# Patient Record
Sex: FEMALE | Race: WHITE | Employment: OTHER | URBAN - METROPOLITAN AREA
[De-identification: names, ages, dates, MRNs, and addresses within clinical notes are randomized per-mention and may not be internally consistent; named-entity substitution may affect disease eponyms.]

---

## 2019-03-05 ENCOUNTER — HOSPITAL ENCOUNTER (OUTPATIENT)
Age: 64
Discharge: HOME | End: 2019-03-05
Payer: COMMERCIAL

## 2019-03-05 LAB
ABSOLUTE EOS #: 0.25 K/UL (ref 0–0.44)
ABSOLUTE IMMATURE GRANULOCYTE: 0.04 K/UL (ref 0–0.3)
ABSOLUTE LYMPH #: 1.78 K/UL (ref 1.1–3.7)
ABSOLUTE MONO #: 0.67 K/UL (ref 0.1–1.2)
ALBUMIN SERPL-MCNC: 4.2 G/DL (ref 3.5–5.2)
ALBUMIN/GLOBULIN RATIO: 1.6 (ref 1–2.5)
ALP BLD-CCNC: 52 U/L (ref 35–104)
ALT SERPL-CCNC: 11 U/L (ref 5–33)
ANION GAP SERPL CALCULATED.3IONS-SCNC: 16 MMOL/L (ref 9–17)
AST SERPL-CCNC: 24 U/L
BASOPHILS # BLD: 1 % (ref 0–2)
BASOPHILS ABSOLUTE: 0.06 K/UL (ref 0–0.2)
BILIRUB SERPL-MCNC: 0.38 MG/DL (ref 0.3–1.2)
BUN BLDV-MCNC: 17 MG/DL (ref 8–23)
BUN/CREAT BLD: ABNORMAL (ref 9–20)
CALCIUM SERPL-MCNC: 9.9 MG/DL (ref 8.6–10.4)
CHLORIDE BLD-SCNC: 106 MMOL/L (ref 98–107)
CHOLESTEROL, FASTING: 138 MG/DL
CHOLESTEROL/HDL RATIO: 3.5
CO2: 24 MMOL/L (ref 20–31)
CREAT SERPL-MCNC: 0.74 MG/DL (ref 0.5–0.9)
DIFFERENTIAL TYPE: ABNORMAL
EOSINOPHILS RELATIVE PERCENT: 3 % (ref 1–4)
GFR AFRICAN AMERICAN: >60 ML/MIN
GFR NON-AFRICAN AMERICAN: >60 ML/MIN
GFR SERPL CREATININE-BSD FRML MDRD: ABNORMAL ML/MIN/{1.73_M2}
GFR SERPL CREATININE-BSD FRML MDRD: ABNORMAL ML/MIN/{1.73_M2}
GLUCOSE FASTING: 123 MG/DL (ref 70–99)
HCT VFR BLD CALC: 47.4 % (ref 36.3–47.1)
HDLC SERPL-MCNC: 39 MG/DL
HEMOGLOBIN: 15.1 G/DL (ref 11.9–15.1)
IMMATURE GRANULOCYTES: 1 %
LDL CHOLESTEROL: 76 MG/DL (ref 0–130)
LYMPHOCYTES # BLD: 22 % (ref 24–43)
MCH RBC QN AUTO: 30.1 PG (ref 25.2–33.5)
MCHC RBC AUTO-ENTMCNC: 31.9 G/DL (ref 28.4–34.8)
MCV RBC AUTO: 94.6 FL (ref 82.6–102.9)
MONOCYTES # BLD: 8 % (ref 3–12)
NRBC AUTOMATED: 0 PER 100 WBC
PDW BLD-RTO: 12.5 % (ref 11.8–14.4)
PLATELET # BLD: 263 K/UL (ref 138–453)
PLATELET ESTIMATE: ABNORMAL
PMV BLD AUTO: 10 FL (ref 8.1–13.5)
POTASSIUM SERPL-SCNC: 4.6 MMOL/L (ref 3.7–5.3)
RBC # BLD: 5.01 M/UL (ref 3.95–5.11)
RBC # BLD: ABNORMAL 10*6/UL
SEG NEUTROPHILS: 65 % (ref 36–65)
SEGMENTED NEUTROPHILS ABSOLUTE COUNT: 5.22 K/UL (ref 1.5–8.1)
SODIUM BLD-SCNC: 146 MMOL/L (ref 135–144)
TOTAL PROTEIN: 6.9 G/DL (ref 6.4–8.3)
TRIGLYCERIDE, FASTING: 114 MG/DL
VLDLC SERPL CALC-MCNC: ABNORMAL MG/DL (ref 1–30)
WBC # BLD: 8 K/UL (ref 3.5–11.3)
WBC # BLD: ABNORMAL 10*3/UL

## 2019-03-05 PROCEDURE — 83525 ASSAY OF INSULIN: CPT

## 2019-03-05 PROCEDURE — 85025 COMPLETE CBC W/AUTO DIFF WBC: CPT

## 2019-03-05 PROCEDURE — 80061 LIPID PANEL: CPT

## 2019-03-05 PROCEDURE — 81001 URINALYSIS AUTO W/SCOPE: CPT

## 2019-03-05 PROCEDURE — 80053 COMPREHEN METABOLIC PANEL: CPT

## 2019-03-05 PROCEDURE — 83036 HEMOGLOBIN GLYCOSYLATED A1C: CPT

## 2019-03-05 PROCEDURE — 36415 COLL VENOUS BLD VENIPUNCTURE: CPT

## 2019-03-05 PROCEDURE — 83527 ASSAY OF INSULIN: CPT

## 2019-03-06 LAB
-: ABNORMAL
AMORPHOUS: ABNORMAL
BACTERIA: ABNORMAL
BILIRUBIN URINE: ABNORMAL
CASTS UA: ABNORMAL /LPF (ref 0–8)
COLOR: ABNORMAL
COMMENT UA: ABNORMAL
CRYSTALS, UA: ABNORMAL /HPF
CRYSTALS, UA: ABNORMAL /HPF
EPITHELIAL CELLS UA: ABNORMAL /HPF (ref 0–5)
ESTIMATED AVERAGE GLUCOSE: 114 MG/DL
GLUCOSE URINE: NEGATIVE
HBA1C MFR BLD: 5.6 % (ref 4–6)
KETONES, URINE: ABNORMAL
LEUKOCYTE ESTERASE, URINE: ABNORMAL
MUCUS: ABNORMAL
NITRITE, URINE: NEGATIVE
OTHER OBSERVATIONS UA: ABNORMAL
PH UA: 5.5 (ref 5–8)
PROTEIN UA: NEGATIVE
RBC UA: ABNORMAL /HPF (ref 0–4)
RENAL EPITHELIAL, UA: ABNORMAL /HPF
SPECIFIC GRAVITY UA: 1.02 (ref 1–1.03)
TRICHOMONAS: ABNORMAL
TURBIDITY: ABNORMAL
URINE HGB: NEGATIVE
UROBILINOGEN, URINE: NORMAL
WBC UA: ABNORMAL /HPF (ref 0–5)
YEAST: ABNORMAL

## 2019-03-08 LAB
INSULIN FREE: 34 UIU/ML (ref 3–19)
INSULIN: 40 UIU/ML (ref 3–19)

## 2021-09-12 ENCOUNTER — HOSPITAL ENCOUNTER (INPATIENT)
Age: 66
LOS: 1 days | Discharge: HOME | DRG: 313 | End: 2021-09-13
Attending: EMERGENCY MEDICINE | Admitting: INTERNAL MEDICINE
Payer: MEDICARE

## 2021-09-12 ENCOUNTER — APPOINTMENT (OUTPATIENT)
Dept: GENERAL RADIOLOGY | Facility: CLINIC | Age: 66
DRG: 313 | End: 2021-09-12
Payer: MEDICARE

## 2021-09-12 DIAGNOSIS — R07.9 CHEST PAIN, UNSPECIFIED TYPE: Primary | ICD-10-CM

## 2021-09-12 LAB
ABSOLUTE EOS #: 0.3 K/UL (ref 0–0.4)
ABSOLUTE IMMATURE GRANULOCYTE: ABNORMAL K/UL (ref 0–0.3)
ABSOLUTE LYMPH #: 2.4 K/UL (ref 1–4.8)
ABSOLUTE MONO #: 0.7 K/UL (ref 0.1–1.2)
ANION GAP SERPL CALCULATED.3IONS-SCNC: 10 MMOL/L (ref 9–17)
BASOPHILS # BLD: 1 % (ref 0–2)
BASOPHILS ABSOLUTE: 0.1 K/UL (ref 0–0.2)
BNP INTERPRETATION: NORMAL
BUN BLDV-MCNC: 15 MG/DL (ref 8–23)
BUN/CREAT BLD: ABNORMAL (ref 9–20)
CALCIUM SERPL-MCNC: 9.3 MG/DL (ref 8.6–10.4)
CHLORIDE BLD-SCNC: 103 MMOL/L (ref 98–107)
CO2: 27 MMOL/L (ref 20–31)
CREAT SERPL-MCNC: 0.6 MG/DL (ref 0.5–0.9)
D-DIMER QUANTITATIVE: 0.43 MG/L FEU
DIFFERENTIAL TYPE: ABNORMAL
EOSINOPHILS RELATIVE PERCENT: 4 % (ref 1–4)
GFR AFRICAN AMERICAN: >60 ML/MIN
GFR NON-AFRICAN AMERICAN: >60 ML/MIN
GFR SERPL CREATININE-BSD FRML MDRD: ABNORMAL ML/MIN/{1.73_M2}
GFR SERPL CREATININE-BSD FRML MDRD: ABNORMAL ML/MIN/{1.73_M2}
GLUCOSE BLD-MCNC: 129 MG/DL (ref 70–99)
HCT VFR BLD CALC: 46.6 % (ref 36–46)
HEMOGLOBIN: 15.4 G/DL (ref 12–16)
IMMATURE GRANULOCYTES: ABNORMAL %
LYMPHOCYTES # BLD: 30 % (ref 24–44)
MCH RBC QN AUTO: 29.7 PG (ref 26–34)
MCHC RBC AUTO-ENTMCNC: 33.1 G/DL (ref 31–37)
MCV RBC AUTO: 89.9 FL (ref 80–100)
MONOCYTES # BLD: 9 % (ref 2–11)
NRBC AUTOMATED: ABNORMAL PER 100 WBC
PDW BLD-RTO: 13.5 % (ref 12.5–15.4)
PLATELET # BLD: 250 K/UL (ref 140–450)
PLATELET ESTIMATE: ABNORMAL
PMV BLD AUTO: 7.7 FL (ref 6–12)
POTASSIUM SERPL-SCNC: 3.8 MMOL/L (ref 3.7–5.3)
PRO-BNP: 94 PG/ML
RBC # BLD: 5.19 M/UL (ref 4–5.2)
RBC # BLD: ABNORMAL 10*6/UL
SEG NEUTROPHILS: 56 % (ref 36–66)
SEGMENTED NEUTROPHILS ABSOLUTE COUNT: 4.6 K/UL (ref 1.8–7.7)
SODIUM BLD-SCNC: 140 MMOL/L (ref 135–144)
TROPONIN INTERP: NORMAL
TROPONIN T: NORMAL NG/ML
TROPONIN, HIGH SENSITIVITY: 6 NG/L (ref 0–14)
TROPONIN, HIGH SENSITIVITY: 6 NG/L (ref 0–14)
TROPONIN, HIGH SENSITIVITY: 7 NG/L (ref 0–14)
TROPONIN, HIGH SENSITIVITY: 8 NG/L (ref 0–14)
WBC # BLD: 8.2 K/UL (ref 3.5–11)
WBC # BLD: ABNORMAL 10*3/UL

## 2021-09-12 PROCEDURE — 80048 BASIC METABOLIC PNL TOTAL CA: CPT

## 2021-09-12 PROCEDURE — 83880 ASSAY OF NATRIURETIC PEPTIDE: CPT

## 2021-09-12 PROCEDURE — 85025 COMPLETE CBC W/AUTO DIFF WBC: CPT

## 2021-09-12 PROCEDURE — 71045 X-RAY EXAM CHEST 1 VIEW: CPT

## 2021-09-12 PROCEDURE — 99285 EMERGENCY DEPT VISIT HI MDM: CPT

## 2021-09-12 PROCEDURE — 84484 ASSAY OF TROPONIN QUANT: CPT

## 2021-09-12 PROCEDURE — 99219 PR INITIAL OBSERVATION CARE/DAY 50 MINUTES: CPT | Performed by: NURSE PRACTITIONER

## 2021-09-12 PROCEDURE — 36415 COLL VENOUS BLD VENIPUNCTURE: CPT

## 2021-09-12 PROCEDURE — 85379 FIBRIN DEGRADATION QUANT: CPT

## 2021-09-12 PROCEDURE — 93005 ELECTROCARDIOGRAM TRACING: CPT | Performed by: EMERGENCY MEDICINE

## 2021-09-12 PROCEDURE — 1200000000 HC SEMI PRIVATE

## 2021-09-12 RX ORDER — ACETAMINOPHEN 650 MG/1
650 SUPPOSITORY RECTAL EVERY 6 HOURS PRN
Status: DISCONTINUED | OUTPATIENT
Start: 2021-09-12 | End: 2021-09-13 | Stop reason: HOSPADM

## 2021-09-12 RX ORDER — LOSARTAN POTASSIUM 100 MG/1
100 TABLET ORAL DAILY
Status: DISCONTINUED | OUTPATIENT
Start: 2021-09-13 | End: 2021-09-13 | Stop reason: HOSPADM

## 2021-09-12 RX ORDER — ARIPIPRAZOLE 5 MG/1
10 TABLET ORAL DAILY
Status: DISCONTINUED | OUTPATIENT
Start: 2021-09-13 | End: 2021-09-13

## 2021-09-12 RX ORDER — SODIUM CHLORIDE 9 MG/ML
25 INJECTION, SOLUTION INTRAVENOUS PRN
Status: DISCONTINUED | OUTPATIENT
Start: 2021-09-12 | End: 2021-09-13 | Stop reason: HOSPADM

## 2021-09-12 RX ORDER — CARVEDILOL 12.5 MG/1
12.5 TABLET ORAL 2 TIMES DAILY WITH MEALS
Status: ON HOLD | COMMUNITY
End: 2021-09-13

## 2021-09-12 RX ORDER — AMLODIPINE BESYLATE 5 MG/1
5 TABLET ORAL DAILY
Status: DISCONTINUED | OUTPATIENT
Start: 2021-09-13 | End: 2021-09-13 | Stop reason: HOSPADM

## 2021-09-12 RX ORDER — SODIUM CHLORIDE 0.9 % (FLUSH) 0.9 %
5-40 SYRINGE (ML) INJECTION EVERY 12 HOURS SCHEDULED
Status: DISCONTINUED | OUTPATIENT
Start: 2021-09-12 | End: 2021-09-13 | Stop reason: HOSPADM

## 2021-09-12 RX ORDER — LOSARTAN POTASSIUM 50 MG/1
TABLET ORAL
Status: ON HOLD | COMMUNITY
End: 2021-09-13

## 2021-09-12 RX ORDER — CLOPIDOGREL BISULFATE 75 MG/1
75 TABLET ORAL DAILY
Status: DISCONTINUED | OUTPATIENT
Start: 2021-09-13 | End: 2021-09-13 | Stop reason: HOSPADM

## 2021-09-12 RX ORDER — OMEGA-3S/DHA/EPA/FISH OIL/D3 300MG-1000
400 CAPSULE ORAL DAILY
Status: ON HOLD | COMMUNITY
End: 2021-09-13

## 2021-09-12 RX ORDER — ACETAMINOPHEN 325 MG/1
650 TABLET ORAL EVERY 6 HOURS PRN
Status: DISCONTINUED | OUTPATIENT
Start: 2021-09-12 | End: 2021-09-13 | Stop reason: HOSPADM

## 2021-09-12 RX ORDER — ALBUTEROL SULFATE 2.5 MG/3ML
2.5 SOLUTION RESPIRATORY (INHALATION) EVERY 4 HOURS PRN
Status: DISCONTINUED | OUTPATIENT
Start: 2021-09-12 | End: 2021-09-13

## 2021-09-12 RX ORDER — PANTOPRAZOLE SODIUM 40 MG/1
40 TABLET, DELAYED RELEASE ORAL
Status: DISCONTINUED | OUTPATIENT
Start: 2021-09-13 | End: 2021-09-13 | Stop reason: HOSPADM

## 2021-09-12 RX ORDER — DULOXETIN HYDROCHLORIDE 60 MG/1
60 CAPSULE, DELAYED RELEASE ORAL DAILY
Status: DISCONTINUED | OUTPATIENT
Start: 2021-09-13 | End: 2021-09-13 | Stop reason: HOSPADM

## 2021-09-12 RX ORDER — HYDRALAZINE HYDROCHLORIDE 10 MG/1
10 TABLET, FILM COATED ORAL EVERY 8 HOURS SCHEDULED
Status: DISCONTINUED | OUTPATIENT
Start: 2021-09-12 | End: 2021-09-13 | Stop reason: HOSPADM

## 2021-09-12 RX ORDER — PANTOPRAZOLE SODIUM 40 MG/1
40 TABLET, DELAYED RELEASE ORAL DAILY
Status: ON HOLD | COMMUNITY
End: 2021-09-13

## 2021-09-12 RX ORDER — ASPIRIN 81 MG/1
81 TABLET, CHEWABLE ORAL DAILY
Status: DISCONTINUED | OUTPATIENT
Start: 2021-09-13 | End: 2021-09-13 | Stop reason: HOSPADM

## 2021-09-12 RX ORDER — CARVEDILOL 12.5 MG/1
12.5 TABLET ORAL 2 TIMES DAILY WITH MEALS
Status: DISCONTINUED | OUTPATIENT
Start: 2021-09-13 | End: 2021-09-13 | Stop reason: HOSPADM

## 2021-09-12 RX ORDER — ATORVASTATIN CALCIUM 40 MG/1
40 TABLET, FILM COATED ORAL NIGHTLY
Status: DISCONTINUED | OUTPATIENT
Start: 2021-09-12 | End: 2021-09-13

## 2021-09-12 RX ORDER — ONDANSETRON 2 MG/ML
4 INJECTION INTRAMUSCULAR; INTRAVENOUS EVERY 6 HOURS PRN
Status: DISCONTINUED | OUTPATIENT
Start: 2021-09-12 | End: 2021-09-13 | Stop reason: HOSPADM

## 2021-09-12 RX ORDER — ALBUTEROL SULFATE 2.5 MG/3ML
3 SOLUTION RESPIRATORY (INHALATION)
Status: ON HOLD | COMMUNITY
End: 2021-09-13

## 2021-09-12 RX ORDER — OXYBUTYNIN CHLORIDE 10 MG/1
10 TABLET, EXTENDED RELEASE ORAL DAILY
Status: ON HOLD | COMMUNITY
End: 2021-09-13

## 2021-09-12 RX ORDER — IPRATROPIUM BROMIDE AND ALBUTEROL SULFATE 2.5; .5 MG/3ML; MG/3ML
3 SOLUTION RESPIRATORY (INHALATION)
Status: ON HOLD | COMMUNITY
End: 2021-09-13

## 2021-09-12 RX ORDER — SIMVASTATIN 20 MG
20 TABLET ORAL NIGHTLY
Status: ON HOLD | COMMUNITY
Start: 2021-08-26 | End: 2021-09-13 | Stop reason: HOSPADM

## 2021-09-12 RX ORDER — AMLODIPINE BESYLATE 5 MG/1
5 TABLET ORAL ONCE
Status: COMPLETED | OUTPATIENT
Start: 2021-09-13 | End: 2021-09-13

## 2021-09-12 RX ORDER — ONDANSETRON 4 MG/1
4 TABLET, ORALLY DISINTEGRATING ORAL EVERY 8 HOURS PRN
Status: DISCONTINUED | OUTPATIENT
Start: 2021-09-12 | End: 2021-09-13 | Stop reason: HOSPADM

## 2021-09-12 RX ORDER — SODIUM CHLORIDE 0.9 % (FLUSH) 0.9 %
5-40 SYRINGE (ML) INJECTION PRN
Status: DISCONTINUED | OUTPATIENT
Start: 2021-09-12 | End: 2021-09-13 | Stop reason: HOSPADM

## 2021-09-12 RX ORDER — POLYETHYLENE GLYCOL 3350 17 G/17G
17 POWDER, FOR SOLUTION ORAL DAILY PRN
Status: DISCONTINUED | OUTPATIENT
Start: 2021-09-12 | End: 2021-09-13 | Stop reason: HOSPADM

## 2021-09-12 ASSESSMENT — ENCOUNTER SYMPTOMS
VOMITING: 0
BLOOD IN STOOL: 0
COUGH: 0
DIARRHEA: 0
COLOR CHANGE: 0
SHORTNESS OF BREATH: 1
ABDOMINAL PAIN: 0
CONSTIPATION: 0
NAUSEA: 0
WHEEZING: 0

## 2021-09-12 ASSESSMENT — PAIN SCALES - GENERAL: PAINLEVEL_OUTOF10: 0

## 2021-09-12 NOTE — ED NOTES
Mode of arrival (squad #, walk in, police, etc) : BE86        Chief complaint(s): chest pain        Arrival Note (brief scenario, treatment PTA, etc). : Pt reports chest pain onset at 2230. Pt reports that she was woken up by a pain in her chest that radiated to her jaw. Upon arrival to the ED pt denies complaints at this. Pt lives at assisted living facility, pt reports that she has been taking her medications as prescribed. C= \"Have you ever felt that you should Cut down on your drinking? \"  No  A= \"Have people Annoyed you by criticizing your drinking? \"  No  G= \"Have you ever felt bad or Guilty about your drinking? \"  No  E= \"Have you ever had a drink as an Eye-opener first thing in the morning to steady your nerves or to help a hangover? \"  No      Deferred []      Reason for deferring: N/A    *If yes to two or more: probable alcohol abuse. Panchito King RN  09/12/21 6704

## 2021-09-12 NOTE — ED NOTES
Pt assisted to bathroom per wheelchair, tolerated without complaints, no distress noted     Mary Carmen Bradshaw RN  09/12/21 9721

## 2021-09-12 NOTE — ED NOTES
Pt offered beverage, denies any needs at this time, continue to monitor, family at [place], RN  09/12/21 6400

## 2021-09-12 NOTE — ED PROVIDER NOTES
[facility]  [facility]  Phone: [phone]      Pt Name: Anup Cheung  ACZ:[id number]  [place] 1955  Date of evaluation: 9/12/2021      CHIEF COMPLAINT       Chief Complaint   Patient presents with    Chest Pain       HISTORY OF PRESENT ILLNESS   Anup Cheung is a 78 y.o. female with history of hypertension, CVA, Parkinson's, and asthma who presents for evaluation of chest pain. The patient reports that starting around 10:30 PM last night she began having intermittent episodes of sharp, stabbing, right-sided chest pain that would intermittently radiate into her right jaw. She also complains of associated shortness of breath and nonproductive cough. She has not taken any medications for her symptoms and does not list any provoking or palliating factors. She lives in assisted living and called 911. EMS gave her 4 baby aspirin. Patient denies any personal history of CAD or blood clots but does have family history of CAD. She had a stress test years ago that was negative. She currently denies any chest pain. The patient denies fever, chills, headache, vision changes, neck pain, back pain, diaphoresis, abdominal pain, nausea, vomiting, dizziness, lightheadedness, focal weakness, numbness, tingling, recent injury or illness. REVIEW OF SYSTEMS     Ten point review of systems was reviewed and is negative unless otherwise noted in the HPI    [address]    has a past medical history of Arthritis, Asthma, Cerebral artery occlusion with cerebral infarction ([address] Depression, Gastric reflux, Hyperlipidemia, Hypertension, Parkinson disease ([address] Restless leg, Sleep apnea, and Urinary incontinence. SURGICAL HISTORY      has a past surgical history that includes Wrist surgery; Tonsillectomy; Adenoidectomy; Foot surgery; and Cardiac catheterization.     CURRENT MEDICATIONS       Previous Medications    ALBUTEROL (PROVENTIL) (2.5 MG/3ML) 0.083% NEBULIZER SOLUTION    3 mLs    ALBUTEROL SULFATE HFA (PROAIR HFA) 108 (90 BASE) MCG/ACT INHALER    Inhale 2 puffs every 4 hours by inhalation route for 30 days. AMLODIPINE (NORVASC) 5 MG TABLET    TAKE ONE TABLET BY MOUTH ONCE DAILY    ARIPIPRAZOLE (ABILIFY) 10 MG TABLET    Take 1 tablet every day by oral route. ATORVASTATIN (LIPITOR) 20 MG TABLET    TAKE ONE TABLET BY MOUTH ONCE DAILY AT BEDTIME    CALCIUM CARBONATE (CALCIUM 600 PO)    Take by mouth    CARVEDILOL (COREG) 12.5 MG TABLET    TAKE ONE TABLET BY MOUTH TWICE A DAY WITH MORNING AND EVENING MEAL. CLOPIDOGREL (PLAVIX) 75 MG TABLET    TAKE ONE TABLET BY MOUTH ONCE DAILY    DULOXETINE (CYMBALTA) 60 MG EXTENDED RELEASE CAPSULE    duloxetine 60 mg capsule,delayed release    HYDRALAZINE (APRESOLINE) 10 MG TABLET    hydralazine 10 mg tablet    HYDROCODONE-ACETAMINOPHEN (NORCO) 5-325 MG PER TABLET    Take 1 tablet every 8 hours by oral route as needed. LOSARTAN (COZAAR) 100 MG TABLET    TAKE ONE TABLET BY MOUTH ONCE DAILY    OXYBUTYNIN (DITROPAN-XL) 10 MG EXTENDED RELEASE TABLET    TAKE ONE TABLET BY MOUTH ONCE DAILY    PANTOPRAZOLE (PROTONIX) 40 MG TABLET    TAKE ONE TABLET BY MOUTH ONCE DAILY FOR  90  DAYS    ROPINIROLE (REQUIP) 0.5 MG TABLET    ropinirole 0.5 mg tablet    TRIHEXYPHENIDYL (ARTANE) 2 MG TABLET    Take 2 mg by mouth    ZOLEDRONIC ACID (RECLAST IV)    Infuse intravenously       ALLERGIES     is allergic to codeine, erythromycin base, lisinopril, penicillins, phenobarbital, tetanus-diphtheria toxoids td, tetracyclines & related, and nickel. FAMILY HISTORY     has no family status information on file. family history is not on file. SOCIAL HISTORY      reports that she has never smoked. She does not have any smokeless tobacco history on file. She reports that she does not drink alcohol and does not use drugs. PHYSICAL EXAM     INITIAL VITALS:  height is 5' 2\" (1.575 m) and weight is 99.8 kg (220 lb).  Her oral temperature is 97.9 °F (36.6 °C). Her blood pressure is 141/76 (abnormal) and her pulse is 76. Her respiration is 16 and oxygen saturation is 95%. CONSTITUTIONAL: no apparent distress, well appearing, masklike facies  SKIN: warm, dry, no jaundice, hives or petechiae  EYES: clear conjunctiva, non-icteric sclera  HENT: normocephalic, atraumatic, moist mucus membranes  NECK: Nontender and supple with no nuchal rigidity, full range of motion  PULMONARY: clear to auscultation without wheezes, rhonchi, or rales, normal excursion, no accessory muscle use and no stridor  CARDIOVASCULAR: regular rate, rhythm. Strong radial pulses with intact distal perfusion. Capillary refill <2 seconds. GASTROINTESTINAL: soft, non-tender, non-distended, no palpable masses, no rebound or guarding   GENITOURINARY: No costovertebral angle tenderness to palpation  MUSCULOSKELETAL: No midline spinal tenderness, step off or deformity. Extremities are otherwise nontender to palpation and nonerythematous. Compartments soft. No peripheral edema. NEUROLOGIC: alert and oriented x 3, GCS 15, normal mentation and speech. Moves all extremities x 4 without motor or sensory deficit  PSYCHIATRIC: normal mood and affect, thought process is clear and linear    DIAGNOSTIC RESULTS     EKG:  EKG 5:51 AM sinus rhythm, rate 80 bpm, normal axis, normal intervals, no ST elevation or depression, T wave flattening in 2 and aVF, T wave inversion in V2, V3, V4, V5, V6, 1 and aVL, poor R wave progression, Q wave in aVR, no previous EKG for comparison    RADIOLOGY:   XR CHEST PORTABLE    Result Date: 9/12/2021  EXAMINATION: ONE XRAY VIEW OF THE CHEST 9/12/2021 6:14 am COMPARISON: None. HISTORY: ORDERING SYSTEM PROVIDED HISTORY: chest pain TECHNOLOGIST PROVIDED HISTORY: chest pain Reason for Exam: Atypical Mid Chest pain Acuity: Acute Type of Exam: Initial FINDINGS: Heart size and pulmonary vasculature are normal.  The lungs are clear and normally expanded.   Surrounding osseous and soft tissue structures are unremarkable. Normal examination.        LABS:  Results for orders placed or performed during the hospital encounter of 09/12/21   CBC Auto Differential   Result Value Ref Range    WBC 8.2 3.5 - 11.0 k/uL    RBC 5.19 4.0 - 5.2 m/uL    Hemoglobin 15.4 12.0 - 16.0 g/dL    Hematocrit 46.6 (H) 36 - 46 %    MCV 89.9 80 - 100 fL    MCH 29.7 26 - 34 pg    MCHC 33.1 31 - 37 g/dL    RDW 13.5 12.5 - 15.4 %    Platelets 099 412 - 008 k/uL    MPV 7.7 6.0 - 12.0 fL    NRBC Automated NOT REPORTED per 100 WBC    Differential Type NOT REPORTED     Seg Neutrophils 56 36 - 66 %    Lymphocytes 30 24 - 44 %    Monocytes 9 2 - 11 %    Eosinophils % 4 1 - 4 %    Basophils 1 0 - 2 %    Immature Granulocytes NOT REPORTED 0 %    Segs Absolute 4.60 1.8 - 7.7 k/uL    Absolute Lymph # 2.40 1.0 - 4.8 k/uL    Absolute Mono # 0.70 0.1 - 1.2 k/uL    Absolute Eos # 0.30 0.0 - 0.4 k/uL    Basophils Absolute 0.10 0.0 - 0.2 k/uL    Absolute Immature Granulocyte NOT REPORTED 0.00 - 0.30 k/uL    WBC Morphology NOT REPORTED     RBC Morphology NOT REPORTED     Platelet Estimate NOT REPORTED    Basic Metabolic Panel w/ Reflex to MG   Result Value Ref Range    Glucose 129 (H) 70 - 99 mg/dL    BUN 15 8 - 23 mg/dL    CREATININE 0.60 0.50 - 0.90 mg/dL    Bun/Cre Ratio NOT REPORTED 9 - 20    Calcium 9.3 8.6 - 10.4 mg/dL    Sodium 140 135 - 144 mmol/L    Potassium 3.8 3.7 - 5.3 mmol/L    Chloride 103 98 - 107 mmol/L    CO2 27 20 - 31 mmol/L    Anion Gap 10 9 - 17 mmol/L    GFR Non-African American >60 >60 mL/min    GFR African American >60 >60 mL/min    GFR Comment          GFR Staging NOT REPORTED    Troponin   Result Value Ref Range    Troponin, High Sensitivity 7 0 - 14 ng/L    Troponin T NOT REPORTED <0.03 ng/mL    Troponin Interp NOT REPORTED    Brain Natriuretic Peptide   Result Value Ref Range    Pro-BNP 94 <300 pg/mL    BNP Interpretation Pro-BNP Reference Range:    D-Dimer, Quantitative   Result Value Ref Range    D-Dimer, Quant 0.43 mg/L FEU       EMERGENCY DEPARTMENT COURSE:        The patient was given the following medications:  No orders of the defined types were placed in this encounter. Vitals:    Vitals:    09/12/21 0547 09/12/21 0657 09/12/21 0718   BP: 128/76  (!) 141/76   Pulse: 83  76   Resp: 19  16   Temp:  97.9 °F (36.6 °C)    TempSrc:  Oral    SpO2: 96%  95%   Weight: 99.8 kg (220 lb)     Height: 5' 2\" (1.575 m)       -------------------------  BP: (!) 141/76, Temp: 97.9 °F (36.6 °C), Pulse: 76, Resp: 16    CONSULTS:  None    CRITICAL CARE:   None    PROCEDURES:  None    DIAGNOSIS/ MDM:   Barney Chilel is a 78 y.o. female who presents with chest pain. Vital signs are stable. She is currently chest pain-free. Exam is grossly unremarkable. CBC, BMP, troponin, D-dimer and BNP are unremarkable. Chest x-ray shows no acute process. Plan to obtain second troponin with repeat EKG. The patient was signed out to Dr. Cesar Fine.         FINAL IMPRESSION    Chest pain    DISPOSITION/PLAN   DISPOSITION        (Please note that portions of this note were completed with a voice recognitionprogram.  Efforts were made to edit the dictations but occasionally words are mis-transcribed.)    Tavo Edward DO DO  Emergency Physician Attending         Tavo Edward DO  09/12/21 5133

## 2021-09-12 NOTE — ED PROVIDER NOTES
TABLET    TAKE ONE TABLET BY MOUTH ONCE DAILY FOR  90  DAYS    ROPINIROLE (REQUIP) 0.5 MG TABLET    ropinirole 0.5 mg tablet    TRIHEXYPHENIDYL (ARTANE) 2 MG TABLET    Take 2 mg by mouth    ZOLEDRONIC ACID (RECLAST IV)    Infuse intravenously       ALLERGIES     is allergic to codeine, erythromycin base, lisinopril, penicillins, phenobarbital, tetanus-diphtheria toxoids td, tetracyclines & related, and nickel. FAMILY HISTORY     has no family status information on file. family history is not on file. SOCIAL HISTORY      reports that she has never smoked. She does not have any smokeless tobacco history on file. She reports that she does not drink alcohol and does not use drugs. Diagnostic Results     EKG     2nd EKG 8:07 AM sinus rhythm rate 79  QRS 88  no ST elevations. Diffuse T wave inversions.   No change from 1st EKG    LABS:   Results for orders placed or performed during the hospital encounter of 09/12/21   CBC Auto Differential   Result Value Ref Range    WBC 8.2 3.5 - 11.0 k/uL    RBC 5.19 4.0 - 5.2 m/uL    Hemoglobin 15.4 12.0 - 16.0 g/dL    Hematocrit 46.6 (H) 36 - 46 %    MCV 89.9 80 - 100 fL    MCH 29.7 26 - 34 pg    MCHC 33.1 31 - 37 g/dL    RDW 13.5 12.5 - 15.4 %    Platelets 019 769 - 660 k/uL    MPV 7.7 6.0 - 12.0 fL    NRBC Automated NOT REPORTED per 100 WBC    Differential Type NOT REPORTED     Seg Neutrophils 56 36 - 66 %    Lymphocytes 30 24 - 44 %    Monocytes 9 2 - 11 %    Eosinophils % 4 1 - 4 %    Basophils 1 0 - 2 %    Immature Granulocytes NOT REPORTED 0 %    Segs Absolute 4.60 1.8 - 7.7 k/uL    Absolute Lymph # 2.40 1.0 - 4.8 k/uL    Absolute Mono # 0.70 0.1 - 1.2 k/uL    Absolute Eos # 0.30 0.0 - 0.4 k/uL    Basophils Absolute 0.10 0.0 - 0.2 k/uL    Absolute Immature Granulocyte NOT REPORTED 0.00 - 0.30 k/uL    WBC Morphology NOT REPORTED     RBC Morphology NOT REPORTED     Platelet Estimate NOT REPORTED    Basic Metabolic Panel w/ Reflex to MG   Result Value Ref Range    Glucose 129 (H) 70 - 99 mg/dL    BUN 15 8 - 23 mg/dL    CREATININE 0.60 0.50 - 0.90 mg/dL    Bun/Cre Ratio NOT REPORTED 9 - 20    Calcium 9.3 8.6 - 10.4 mg/dL    Sodium 140 135 - 144 mmol/L    Potassium 3.8 3.7 - 5.3 mmol/L    Chloride 103 98 - 107 mmol/L    CO2 27 20 - 31 mmol/L    Anion Gap 10 9 - 17 mmol/L    GFR Non-African American >60 >60 mL/min    GFR African American >60 >60 mL/min    GFR Comment          GFR Staging NOT REPORTED    Troponin   Result Value Ref Range    Troponin, High Sensitivity 7 0 - 14 ng/L    Troponin T NOT REPORTED <0.03 ng/mL    Troponin Interp NOT REPORTED    Brain Natriuretic Peptide   Result Value Ref Range    Pro-BNP 94 <300 pg/mL    BNP Interpretation Pro-BNP Reference Range:    D-Dimer, Quantitative   Result Value Ref Range    D-Dimer, Quant 0.43 mg/L FEU   Troponin   Result Value Ref Range    Troponin, High Sensitivity 6 0 - 14 ng/L    Troponin T NOT REPORTED <0.03 ng/mL    Troponin Interp NOT REPORTED    EKG 12 Lead   Result Value Ref Range    Ventricular Rate 80 BPM    Atrial Rate 80 BPM    P-R Interval 138 ms    QRS Duration 84 ms    Q-T Interval 412 ms    QTc Calculation (Bazett) 475 ms    P Axis 21 degrees    R Axis -16 degrees    T Axis -174 degrees   EKG 12 Lead   Result Value Ref Range    Ventricular Rate 79 BPM    Atrial Rate 79 BPM    P-R Interval 134 ms    QRS Duration 88 ms    Q-T Interval 392 ms    QTc Calculation (Bazett) 449 ms    P Axis 20 degrees    R Axis -15 degrees    T Axis 175 degrees       RADIOLOGY:  XR CHEST PORTABLE   Final Result   Normal examination. ED Course     The patient was given the following medications:  No orders of the defined types were placed in this encounter. RECENT VITALS:  BP (!) 141/76   Pulse 76   Temp 97.9 °F (36.6 °C) (Oral)   Resp 16   Ht 5' 2\" (1.575 m)   Wt 99.8 kg (220 lb)   SpO2 95%   BMI 40.24 kg/m²     7:15 AM care assumed from Dr. Madie Christian. Awaiting 2nd troponin and EKG.   1st troponin normal.  CBC and BMP unremarkable. 9 AM repeat troponin normal repeat EKG unchanged. Chest pain with radiation to her jaw and associated shortness of breath and diaphoresis. No cardiologist.  Recommend observation patient agreeable. Will discuss with Brayden Brownlee. 950 AM Discussed with Dr. Sy Saenz at [phone] agrees to admission. Awaiting bed assignment and transport. OARRS Report if indicated             I have reviewed the disposition diagnosis with the patient and or their family/guardian. I have answered their questions and given discharge instructions. They voiced understanding of these instructions and did not have any further questions or complaints. Disposition     CLINICAL IMPRESSION:  1. Chest pain, unspecified type        PATIENT REFERRED TO:  No follow-up provider specified. DISCHARGE MEDICATIONS:  New Prescriptions    No medications on file       DISPOSITION:   DISPOSITION Decision To Admit 09/12/2021 08:53:27 AM      (Please note that portions of this note were completed with a voice recognition program.  Efforts were made to edit the dictations but occasionally words are mis-transcribed.)    DO SEAN HatfieldO.          Felipe Brown DO  09/14/21 8267

## 2021-09-12 NOTE — ED NOTES
Pt resting,denies chest pain, updated on plan of care     [facility], RN  09/12/21 9631

## 2021-09-13 ENCOUNTER — APPOINTMENT (OUTPATIENT)
Dept: NUCLEAR MEDICINE | Age: 66
DRG: 313 | End: 2021-09-13
Payer: MEDICARE

## 2021-09-13 VITALS
HEIGHT: 62 IN | BODY MASS INDEX: 40.48 KG/M2 | SYSTOLIC BLOOD PRESSURE: 119 MMHG | HEART RATE: 70 BPM | WEIGHT: 220 LBS | RESPIRATION RATE: 16 BRPM | OXYGEN SATURATION: 95 % | TEMPERATURE: 97.7 F | DIASTOLIC BLOOD PRESSURE: 73 MMHG

## 2021-09-13 LAB
EKG ATRIAL RATE: 79 BPM
EKG ATRIAL RATE: 80 BPM
EKG P AXIS: 20 DEGREES
EKG P AXIS: 21 DEGREES
EKG P-R INTERVAL: 134 MS
EKG P-R INTERVAL: 138 MS
EKG Q-T INTERVAL: 392 MS
EKG Q-T INTERVAL: 412 MS
EKG QRS DURATION: 84 MS
EKG QRS DURATION: 88 MS
EKG QTC CALCULATION (BAZETT): 449 MS
EKG QTC CALCULATION (BAZETT): 475 MS
EKG R AXIS: -15 DEGREES
EKG R AXIS: -16 DEGREES
EKG T AXIS: -174 DEGREES
EKG T AXIS: 175 DEGREES
EKG VENTRICULAR RATE: 79 BPM
EKG VENTRICULAR RATE: 80 BPM
HCT VFR BLD CALC: 47.9 % (ref 36.3–47.1)
HEMOGLOBIN: 14.9 G/DL (ref 11.9–15.1)
LV EF: 70 %
LVEF MODALITY: NORMAL
MCH RBC QN AUTO: 29.1 PG (ref 25.2–33.5)
MCHC RBC AUTO-ENTMCNC: 31.1 G/DL (ref 28.4–34.8)
MCV RBC AUTO: 93.6 FL (ref 82.6–102.9)
NRBC AUTOMATED: 0 PER 100 WBC
PDW BLD-RTO: 12.9 % (ref 11.8–14.4)
PLATELET # BLD: 257 K/UL (ref 138–453)
PMV BLD AUTO: 9.6 FL (ref 8.1–13.5)
RBC # BLD: 5.12 M/UL (ref 3.95–5.11)
TROPONIN INTERP: NORMAL
TROPONIN T: NORMAL NG/ML
TROPONIN, HIGH SENSITIVITY: <6 NG/L (ref 0–14)
WBC # BLD: 8.3 K/UL (ref 3.5–11.3)

## 2021-09-13 PROCEDURE — 78452 HT MUSCLE IMAGE SPECT MULT: CPT

## 2021-09-13 PROCEDURE — 6360000002 HC RX W HCPCS: Performed by: INTERNAL MEDICINE

## 2021-09-13 PROCEDURE — 2580000003 HC RX 258: Performed by: INTERNAL MEDICINE

## 2021-09-13 PROCEDURE — 85027 COMPLETE CBC AUTOMATED: CPT

## 2021-09-13 PROCEDURE — 93017 CV STRESS TEST TRACING ONLY: CPT

## 2021-09-13 PROCEDURE — 2580000003 HC RX 258: Performed by: STUDENT IN AN ORGANIZED HEALTH CARE EDUCATION/TRAINING PROGRAM

## 2021-09-13 PROCEDURE — 36415 COLL VENOUS BLD VENIPUNCTURE: CPT

## 2021-09-13 PROCEDURE — A9500 TC99M SESTAMIBI: HCPCS | Performed by: INTERNAL MEDICINE

## 2021-09-13 PROCEDURE — 6360000002 HC RX W HCPCS: Performed by: STUDENT IN AN ORGANIZED HEALTH CARE EDUCATION/TRAINING PROGRAM

## 2021-09-13 PROCEDURE — 6370000000 HC RX 637 (ALT 250 FOR IP): Performed by: NURSE PRACTITIONER

## 2021-09-13 PROCEDURE — 6370000000 HC RX 637 (ALT 250 FOR IP): Performed by: INTERNAL MEDICINE

## 2021-09-13 PROCEDURE — 99238 HOSP IP/OBS DSCHRG MGMT 30/<: CPT | Performed by: STUDENT IN AN ORGANIZED HEALTH CARE EDUCATION/TRAINING PROGRAM

## 2021-09-13 PROCEDURE — 3430000000 HC RX DIAGNOSTIC RADIOPHARMACEUTICAL: Performed by: INTERNAL MEDICINE

## 2021-09-13 PROCEDURE — 84484 ASSAY OF TROPONIN QUANT: CPT

## 2021-09-13 PROCEDURE — 6370000000 HC RX 637 (ALT 250 FOR IP): Performed by: STUDENT IN AN ORGANIZED HEALTH CARE EDUCATION/TRAINING PROGRAM

## 2021-09-13 RX ORDER — NITROGLYCERIN 0.4 MG/1
0.4 TABLET SUBLINGUAL EVERY 5 MIN PRN
Status: ACTIVE | OUTPATIENT
Start: 2021-09-13 | End: 2021-09-13

## 2021-09-13 RX ORDER — GLUCOSAMINE/CHONDR SU A SOD 750-600 MG
1 TABLET ORAL DAILY
COMMUNITY

## 2021-09-13 RX ORDER — IBUPROFEN 400 MG/1
400 TABLET ORAL EVERY 6 HOURS PRN
COMMUNITY

## 2021-09-13 RX ORDER — CETIRIZINE HYDROCHLORIDE 10 MG/1
10 TABLET ORAL DAILY PRN
COMMUNITY

## 2021-09-13 RX ORDER — SODIUM CHLORIDE 9 MG/ML
500 INJECTION, SOLUTION INTRAVENOUS CONTINUOUS PRN
Status: ACTIVE | OUTPATIENT
Start: 2021-09-13 | End: 2021-09-13

## 2021-09-13 RX ORDER — VALBENAZINE 40 MG/1
40 CAPSULE ORAL DAILY
COMMUNITY

## 2021-09-13 RX ORDER — ALBUTEROL SULFATE 90 UG/1
2 AEROSOL, METERED RESPIRATORY (INHALATION)
Status: DISCONTINUED | OUTPATIENT
Start: 2021-09-13 | End: 2021-09-13 | Stop reason: HOSPADM

## 2021-09-13 RX ORDER — METOPROLOL TARTRATE 5 MG/5ML
5 INJECTION INTRAVENOUS EVERY 5 MIN PRN
Status: ACTIVE | OUTPATIENT
Start: 2021-09-13 | End: 2021-09-13

## 2021-09-13 RX ORDER — ATROPINE SULFATE 0.1 MG/ML
0.5 INJECTION INTRAVENOUS EVERY 5 MIN PRN
Status: ACTIVE | OUTPATIENT
Start: 2021-09-13 | End: 2021-09-13

## 2021-09-13 RX ORDER — SODIUM CHLORIDE 0.9 % (FLUSH) 0.9 %
5-40 SYRINGE (ML) INJECTION PRN
Status: ACTIVE | OUTPATIENT
Start: 2021-09-13 | End: 2021-09-13

## 2021-09-13 RX ORDER — AMINOPHYLLINE DIHYDRATE 25 MG/ML
50 INJECTION, SOLUTION INTRAVENOUS PRN
Status: ACTIVE | OUTPATIENT
Start: 2021-09-13 | End: 2021-09-13

## 2021-09-13 RX ORDER — ATORVASTATIN CALCIUM 40 MG/1
40 TABLET, FILM COATED ORAL NIGHTLY
Qty: 30 TABLET | Refills: 3 | Status: SHIPPED | OUTPATIENT
Start: 2021-09-13

## 2021-09-13 RX ORDER — MULTIVITAMIN
1 TABLET ORAL DAILY
COMMUNITY

## 2021-09-13 RX ORDER — ATORVASTATIN CALCIUM 10 MG/1
10 TABLET, FILM COATED ORAL NIGHTLY
Status: DISCONTINUED | OUTPATIENT
Start: 2021-09-13 | End: 2021-09-13

## 2021-09-13 RX ORDER — ALBUTEROL SULFATE 90 UG/1
2 AEROSOL, METERED RESPIRATORY (INHALATION) PRN
Status: ACTIVE | OUTPATIENT
Start: 2021-09-13 | End: 2021-09-13

## 2021-09-13 RX ORDER — ACETAMINOPHEN 325 MG/1
650 TABLET ORAL
COMMUNITY

## 2021-09-13 RX ORDER — FLUTICASONE PROPIONATE 50 MCG
1 SPRAY, SUSPENSION (ML) NASAL DAILY
COMMUNITY

## 2021-09-13 RX ORDER — ATORVASTATIN CALCIUM 40 MG/1
40 TABLET, FILM COATED ORAL NIGHTLY
Status: DISCONTINUED | OUTPATIENT
Start: 2021-09-13 | End: 2021-09-13 | Stop reason: HOSPADM

## 2021-09-13 RX ORDER — BUDESONIDE AND FORMOTEROL FUMARATE DIHYDRATE 160; 4.5 UG/1; UG/1
2 AEROSOL RESPIRATORY (INHALATION) 2 TIMES DAILY
COMMUNITY

## 2021-09-13 RX ORDER — BUDESONIDE AND FORMOTEROL FUMARATE DIHYDRATE 160; 4.5 UG/1; UG/1
2 AEROSOL RESPIRATORY (INHALATION) 2 TIMES DAILY
Status: DISCONTINUED | OUTPATIENT
Start: 2021-09-13 | End: 2021-09-13 | Stop reason: HOSPADM

## 2021-09-13 RX ADMIN — HYDRALAZINE HYDROCHLORIDE 10 MG: 10 TABLET, FILM COATED ORAL at 14:02

## 2021-09-13 RX ADMIN — AMLODIPINE BESYLATE 5 MG: 5 TABLET ORAL at 00:06

## 2021-09-13 RX ADMIN — ASPIRIN 81 MG: 81 TABLET, CHEWABLE ORAL at 09:21

## 2021-09-13 RX ADMIN — SODIUM CHLORIDE, PRESERVATIVE FREE 10 ML: 5 INJECTION INTRAVENOUS at 09:33

## 2021-09-13 RX ADMIN — CARVEDILOL 12.5 MG: 12.5 TABLET, FILM COATED ORAL at 09:20

## 2021-09-13 RX ADMIN — CLOPIDOGREL BISULFATE 75 MG: 75 TABLET ORAL at 09:21

## 2021-09-13 RX ADMIN — TETRAKIS(2-METHOXYISOBUTYLISOCYANIDE)COPPER(I) TETRAFLUOROBORATE 18.4 MILLICURIE: 1 INJECTION, POWDER, LYOPHILIZED, FOR SOLUTION INTRAVENOUS at 08:08

## 2021-09-13 RX ADMIN — ATORVASTATIN CALCIUM 10 MG: 10 TABLET, FILM COATED ORAL at 00:06

## 2021-09-13 RX ADMIN — HYDRALAZINE HYDROCHLORIDE 10 MG: 10 TABLET, FILM COATED ORAL at 00:06

## 2021-09-13 RX ADMIN — ENOXAPARIN SODIUM 40 MG: 40 INJECTION SUBCUTANEOUS at 09:27

## 2021-09-13 RX ADMIN — ACETAMINOPHEN 650 MG: 325 TABLET ORAL at 14:01

## 2021-09-13 RX ADMIN — AMLODIPINE BESYLATE 5 MG: 5 TABLET ORAL at 09:21

## 2021-09-13 RX ADMIN — SODIUM CHLORIDE, PRESERVATIVE FREE 10 ML: 5 INJECTION INTRAVENOUS at 08:06

## 2021-09-13 RX ADMIN — REGADENOSON 0.4 MG: 0.08 INJECTION, SOLUTION INTRAVENOUS at 08:06

## 2021-09-13 RX ADMIN — TETRAKIS(2-METHOXYISOBUTYLISOCYANIDE)COPPER(I) TETRAFLUOROBORATE 40.8 MILLICURIE: 1 INJECTION, POWDER, LYOPHILIZED, FOR SOLUTION INTRAVENOUS at 11:25

## 2021-09-13 RX ADMIN — SODIUM CHLORIDE, PRESERVATIVE FREE 10 ML: 5 INJECTION INTRAVENOUS at 00:07

## 2021-09-13 RX ADMIN — DULOXETINE HYDROCHLORIDE 60 MG: 60 CAPSULE, DELAYED RELEASE ORAL at 09:21

## 2021-09-13 RX ADMIN — LOSARTAN POTASSIUM 100 MG: 100 TABLET, FILM COATED ORAL at 09:19

## 2021-09-13 ASSESSMENT — PAIN SCALES - GENERAL
PAINLEVEL_OUTOF10: 0
PAINLEVEL_OUTOF10: 6

## 2021-09-13 NOTE — DISCHARGE SUMMARY
[facility]  Office: [address], DO, Jak Júniorse, DO, Dustin Gucci, DO, Maynor Rahman, DO, Sammy Mata MD, Kali Gray MD, Willian Alvarez MD, Bennett Kaplan MD, Laura Oakes MD, Dillan Patel MD, Javy Manley MD, Rodney Serrato, DO, Reinier Bruce, DO, Carlotta Sotelo MD,  Lauri Bernal DO, Wicho Bowden MD, Nilsa Yap MD, Izabella Dubon MD, Chon Masterson MD, , Erika Aguilar MD, Marta Chowdhury MD, Odette Vanegas MD, Nas Bundy, [facility], [facility], CNP, Yvon Lemons, CNP, Mar Reed, CNS, Grace Dutta, CNP, Ashley Olsen, CNP, Lori Leary, CNP, Mendez Alexander, CNP, Janell Mtz, CNP, Isa Haywood PA-C, Kristen Antonio, [facility], Therese Juárez, CNP, Pattie La, CNP, Nydia Dodd, CNP, Vicki Irizarry, CNP, Spike Young, CNP, Gemini Lawrence, CNP, Dena Lawrence, [facility], [facility]    Discharge Summary     Patient ID: Hipolito Molina  :  1955   MRN: [id number]     ACCOUNT:  [de-identified]   Patient's PCP: Yobany Gilbert DO  Admit Date: 2021   Discharge Date: 2021     Length of Stay: 1  Code Status:  Full Code  Admitting Physician: Chon Masterson MD  Discharge Physician: Chon Masterson MD     Active Discharge Diagnoses:     Hospital Problem Lists:  Principal Problem:    Chest pain  Active Problems:    Parkinson's disease (tremor, stiffness, slow motion, unstable posture) ([facility]    Essential hypertension    Dyslipidemia    Morbid obesity with BMI of 40.0-44.9, adult (HCC)    ANA on CPAP  Resolved Problems:    * No resolved hospital problems. *      Admission Condition:  good     Discharged Condition: good    Hospital Stay:     Hospital Course:  Hipolito Molina is a 78 y.o. female with a past medical history of HTN and HLD who presented to the emergency department on 2021 complaining of substernal chest pain.  The patient reported that her symptoms had begun earlier in the day and remained stable. In the ED, the patient was afebrile and nontoxic appearing. Troponin was within normal limits and EKG was non-ischemic. The patient was admitted for stress testing. NM stress test was done on 9/13 and was a low-risk scan. She is discharged today (9/13) in stable condition. The patient is instructed to follow-up with her primary care physician and cardiology as scheduled. Significant therapeutic interventions: Stress test     Significant Diagnostic Studies:   Labs / Micro:  BMP:    Lab Results   Component Value Date    GLUCOSE 129 09/12/2021     09/12/2021    K 3.8 09/12/2021     09/12/2021    CO2 27 09/12/2021    ANIONGAP 10 09/12/2021    BUN 15 09/12/2021    CREATININE 0.60 09/12/2021    BUNCRER NOT REPORTED 09/12/2021    CALCIUM 9.3 09/12/2021    LABGLOM >60 09/12/2021    GFRAA >60 09/12/2021    GFR      09/12/2021    GFR NOT REPORTED 09/12/2021       Radiology:  XR CHEST PORTABLE    Result Date: 9/12/2021  Normal examination. NM Cardiac Stress Test Nuclear Imaging    Result Date: 9/13/2021  1. No definitive scintigraphic evidence for reversible ischemia or infarct. 2. Left ventricular ejection fracture of greater than 70%. 3.  Please see report for EKG portion of the examination which will be performed separately by physician from cardiology. Risk stratification:  Low risk Note:  Risk stratification incorporates both clinical history and test results. Final risk determination is the responsibility of the ordering provider as history and other test results may increase or decrease the risk stratification reported for this examination. Risk stratification criteria are adapted from \"Noninvasive Risk Stratification\" criteria from [employer].   Al, ACC/AATS/AHA/ASE/ASNC/SCAI/SCCT/STS 2017 Appropriate Use Criteria For Coronary Revascularization in Patients With Stable Ischemic Heart Disease [facility] Volume 69, Issue 17, May 2017 High risk (>3% annual death or MI) 1.  Severe resting LV dysfunction (LVEF >35%) not readily explained by non coronary causes 2. Resting perfusion abnormalities greater than 10% of the myocardium in patients without prior history or evidence of MI 3. Stress-induced perfusion abnormalities encumbering greater than or equal to 10% myocardium or stress segmental scores indicating multiple vascular territories with abnormalities 4. Stress-induced LV dilatation (TID ratio greater than 1.19 for exercise and greater than 1.39 for regadenoson) Intermediate risk (1% to 3% annual death or MI) 1. Mild/moderate resting LV dysfunction (LVEF 35% to 49%) not readily explained by non coronary causes. 2.  Resting perfusion abnormalities in 5%-9.9% of the myocardium in patients without a history or prior evidence of MI 3. Stress-induced perfusion abnormality encumbering 5%-9.9% of the myocardium or stress segmental scores indicating 1 vascular territory with abnormalities but without LV dilation 4. Small wall motion abnormality involving 1-2 segments and only 1 coronary bed. Low Risk (Less than 1% annual death or MI) 1. Normal or small myocardial perfusion defect at rest or with stress encumbering less than 5% of the myocardium. This examination was read in conjunction with the cardiology fellow, Dr. Callie Torres       Consultations:    Consults:     Final Specialist Recommendations/Findings:   IP CONSULT TO CARDIOLOGY      The patient was seen and examined on day of discharge and this discharge summary is in conjunction with any daily progress note from day of discharge. Discharge plan:     Disposition: Home    Physician Follow Up: Jeremiah Arce MD  [address] [place] [address]     In 2 weeks  Chest pain; s/p stress testing     Ronda Velasquez,   [address] [place] [address].  [place]  [address]  [facility] [id number] [phone]    In 1 week  Hospital follow-up        Diet: regular diet    Activity: As tolerated    Instructions to Patient: Follow-up with your primary care physician and cardiology as scheduled. Discharge Medications:      Medication List      START taking these medications    atorvastatin 40 MG tablet  Commonly known as: LIPITOR  Take 1 tablet by mouth nightly        CONTINUE taking these medications    acetaminophen 325 MG tablet  Commonly known as: TYLENOL     amLODIPine 5 MG tablet  Commonly known as: NORVASC     Calcium 600+D3 600-200 MG-UNIT Tabs tablet  Generic drug: calcium carb-cholecalciferol     carbidopa-levodopa  MG per tablet  Commonly known as: SINEMET     carvedilol 12.5 MG tablet  Commonly known as: COREG     cetirizine 10 MG tablet  Commonly known as: ZYRTEC     clopidogrel 75 MG tablet  Commonly known as: PLAVIX     Daily Calixto Tabs     DULoxetine 60 MG extended release capsule  Commonly known as: CYMBALTA     fluticasone 50 MCG/ACT nasal spray  Commonly known as: FLONASE     hydrALAZINE 10 MG tablet  Commonly known as: APRESOLINE     ibuprofen 400 MG tablet  Commonly known as: ADVIL;MOTRIN     Ingrezza 40 MG Caps  Generic drug: Valbenazine Tosylate     losartan 100 MG tablet  Commonly known as: COZAAR     oxybutynin 10 MG extended release tablet  Commonly known as: DITROPAN-XL     pantoprazole 40 MG tablet  Commonly known as: PROTONIX     * ProAir  (90 Base) MCG/ACT inhaler  Generic drug: albuterol sulfate HFA     * albuterol (2.5 MG/3ML) 0.083% nebulizer solution  Commonly known as: PROVENTIL     Symbicort 160-4.5 MCG/ACT Aero  Generic drug: budesonide-formoterol     Vitamin D3 25 MCG (1000 UT) Caps         * This list has 2 medication(s) that are the same as other medications prescribed for you. Read the directions carefully, and ask your doctor or other care provider to review them with you.             STOP taking these medications    RECLAST IV     simvastatin 20 MG tablet  Commonly known as: ZOCOR           Where to Get Your Medications      [facility] [address] [facility] [phone] Josse Torres [phone]  [address] [place] [address]    Phone: [phone]   · atorvastatin 40 MG tablet         No discharge procedures on file. Time Spent on discharge is  20 mins in patient examination, evaluation, counseling as well as medication reconciliation, prescriptions for required medications, discharge plan and follow up. Electronically signed by   Ricardo Mcniell MD  9/13/2021  2:08 PM      Thank you Dr. Richie Gilman DO for the opportunity to be involved in this patient's care.

## 2021-09-13 NOTE — CONSULTS
[facility]    Consult                        Today's Date: 9/13/2021  Patient Name: Amber Morton  Date of admission: 9/12/2021  5:45 AM  Patient's age: 78 y.o., 1955  Admission Dx: Chest pain [R07.9]  Chest pain, unspecified type [R07.9]    Reason for Consult:  Cardiac evaluation    Requesting Physician: Beata Banegas MD    CHIEF COMPLAINT:  Chest pain    History Obtained From:  patient, electronic medical record    HISTORY OF PRESENT ILLNESS:      The patient is a 78 y.o.  female who is admitted to the hospital for chest pain. The patient presents to the hospital with complaint of chest pain. She states her chest pain started 1-2 days ago. She reports her chest pain is left sided with radiation to her left arm and neck. She states her pain is intermittent, sharp and 6/10 in intensity at its peak. She endorses associated shortness of breath and diaphoresis. She denies fever, chills, nausea or vomiting. No additional symptomology or further modifying factors. She states nothing specific would bring the pain on, it occurred at rest, and resolved spontaneously. Presently she denies any chest pain/pressure. She denies any SOB. She has past medical history that includes CVA, hypertension, dyslipidemia, ANA with cpap use and parkinsons disease. She lives at [address] [place] [address]. She does not have a cardiologist. She states she saw a cardiologist in [place] in the 80's but does not recall the specific reason or testing. Past Medical History:   has a past medical history of Arthritis, Asthma, Cerebral artery occlusion with cerebral infarction ([address] Depression, Gastric reflux, Hyperlipidemia, Hypertension, Parkinson disease ([address] Restless leg, Sleep apnea, and Urinary incontinence. Past Surgical History:   has a past surgical history that includes Wrist surgery; Tonsillectomy; Adenoidectomy; Foot surgery; and Cardiac catheterization.      Home Medications:    Prior to Admission medications    Medication Sig Start Date End Date Taking? Authorizing Provider   simvastatin (ZOCOR) 20 MG tablet  8/26/21  Yes Historical Provider, MD   vitamin D3 (CHOLECALCIFEROL) 10 MCG (400 UNIT) TABS tablet Take 400 Units by mouth daily   Yes Historical Provider, MD   carbidopa-levodopa (SINEMET)  MG per tablet  8/26/21  Yes Historical Provider, MD   pantoprazole (PROTONIX) 40 MG tablet TAKE ONE TABLET BY MOUTH ONCE DAILY FOR  90  DAYS   Yes Historical Provider, MD   oxybutynin (DITROPAN-XL) 10 MG extended release tablet TAKE ONE TABLET BY MOUTH ONCE DAILY   Yes Historical Provider, MD   losartan (COZAAR) 100 MG tablet TAKE ONE TABLET BY MOUTH ONCE DAILY   Yes Historical Provider, MD   hydrALAZINE (APRESOLINE) 10 MG tablet hydralazine 10 mg tablet   Yes Historical Provider, MD   DULoxetine (CYMBALTA) 60 MG extended release capsule duloxetine 60 mg capsule,delayed release   Yes Historical Provider, MD   clopidogrel (PLAVIX) 75 MG tablet TAKE ONE TABLET BY MOUTH ONCE DAILY   Yes Historical Provider, MD   carvedilol (COREG) 12.5 MG tablet TAKE ONE TABLET BY MOUTH TWICE A DAY WITH MORNING AND EVENING MEAL. Yes Historical Provider, MD   albuterol sulfate HFA (PROAIR HFA) 108 (90 BASE) MCG/ACT inhaler Inhale 2 puffs every 4 hours by inhalation route for 30 days.    Yes Historical Provider, MD   amLODIPine (NORVASC) 5 MG tablet TAKE ONE TABLET BY MOUTH ONCE DAILY   Yes Historical Provider, MD   albuterol (PROVENTIL) (2.5 MG/3ML) 0.083% nebulizer solution 3 mLs   Yes Historical Provider, MD   Calcium Carbonate (CALCIUM 600 PO) Take by mouth   Yes Historical Provider, MD   losartan (COZAAR) 50 MG tablet losartan 50 mg tablet    Historical Provider, MD   oxybutynin (DITROPAN-XL) 10 MG extended release tablet Take 10 mg by mouth daily    Historical Provider, MD   pantoprazole (PROTONIX) 40 MG tablet Take 40 mg by mouth daily    Historical Provider, MD   carvedilol (COREG) 12.5 MG tablet Take 12.5 mg by mouth 2 times daily (with meals)    Historical Provider, MD   albuterol (PROVENTIL) (2.5 MG/3ML) 0.083% nebulizer solution 3 mLs    Historical Provider, MD   ipratropium-albuterol (DUONEB) 0.5-2.5 (3) MG/3ML SOLN nebulizer solution 3 mLs    Historical Provider, MD   rOPINIRole (REQUIP) 0.5 MG tablet ropinirole 0.5 mg tablet    Historical Provider, MD   atorvastatin (LIPITOR) 20 MG tablet TAKE ONE TABLET BY MOUTH ONCE DAILY AT BEDTIME    Historical Provider, MD   ARIPiprazole (ABILIFY) 10 MG tablet Take 1 tablet every day by oral route. Historical Provider, MD   HYDROcodone-acetaminophen (NORCO) 5-325 MG per tablet Take 1 tablet every 8 hours by oral route as needed. Historical Provider, MD   Zoledronic Acid (RECLAST IV) Infuse intravenously    Historical Provider, MD   trihexyphenidyl (ARTANE) 2 MG tablet Take 2 mg by mouth    Historical Provider, MD       Allergies:  Codeine, Erythromycin base, Lisinopril, Penicillins, Phenobarbital, Tetanus-diphtheria toxoids td, Tetracyclines & related, and Nickel    Social History:   reports that she has never smoked. She does not have any smokeless tobacco history on file. She reports that she does not drink alcohol and does not use drugs. Family History: family history includes Heart Attack in her father; Stroke in her mother. No h/o sudden cardiac death. No for premature CAD    REVIEW OF SYSTEMS:    Constitutional: there has been no unanticipated weight loss. There's been No change in energy level, No change in activity level. Eyes: No visual changes or diplopia. No scleral icterus. ENT: No Headaches, hearing loss or vertigo. No mouth sores or sore throat. Cardiovascular: see above  Respiratory: see above  Gastrointestinal: No abdominal pain, appetite loss, blood in stools. Genitourinary: No dysuria, trouble voiding, or hematuria. Musculoskeletal:  No gait disturbance, No weakness or joint complaints.   Integumentary: No rash or pruritis. Neurological: No headache or diplopia. No tingling  Psychiatric: No anxiety, or depression. Endocrine: No temperature intolerance. Hematologic/Lymphatic: No abnormal bruising or bleeding, blood clots or swollen lymph nodes. Allergic/Immunologic: No nasal congestion or hives. PHYSICAL EXAM:      BP (!) 148/63   Pulse 89   Temp 97.3 °F (36.3 °C) (Oral)   Resp 16   Ht 5' 2\" (1.575 m)   Wt 220 lb (99.8 kg)   SpO2 98%   BMI 40.24 kg/m²    Constitutional and General Appearance: alert, cooperative, no distress and appears stated age  HEENT: PERRL, no cervical lymphadenopathy. No masses palpable. Normal oral mucosa  Respiratory:  Normal excursion and expansion without use of accessory muscles  Resp Auscultation: Good respiratory effort. No for increased work of breathing. On auscultation: clear to auscultation bilaterally. RA without distress  Cardiovascular:  Heart tones are distant and normal. regular S1 and S2. SR 86  Jugular venous pulsation Normal  The carotid upstroke is normal in amplitude and contour without delay or bruit   Abdomen:   soft  Bowel sounds present  Obese  Extremities:   No edema  Neurological:  Alert and oriented. DATA:    Diagnostics:    EKG: normal sinus rhythm, nonspecific ST and T waves changes. ECHO: not obtained. Ejection fraction: %  Stress Test: ordered, but not yet obtained. Cardiac Angiography: not obtained. Labs:     CBC:   Recent Labs     09/12/21  0555 09/13/21  0451   WBC 8.2 8.3   HGB 15.4 14.9   HCT 46.6* 47.9*    257     BMP:   Recent Labs     09/12/21  0555      K 3.8   CO2 27   BUN 15   CREATININE 0.60   LABGLOM >60   GLUCOSE 129*     BNP: No results for input(s): BNP in the last 72 hours. PT/INR: No results for input(s): PROTIME, INR in the last 72 hours. APTT:No results for input(s): APTT in the last 72 hours. CARDIAC ENZYMES:No results for input(s): CKTOTAL, CKMB, CKMBINDEX, TROPONINI in the last 72 hours.   FASTING LIPID PANEL:  Lab Results   Component Value Date    HDL 39 03/05/2019     LIVER PROFILE:No results for input(s): AST, ALT, LABALBU in the last 72 hours. IMPRESSION:    Patient Active Problem List   Diagnosis    Arthritis of left hip    Encounter for pain management planning    Parkinson's disease (tremor, stiffness, slow motion, unstable posture) (HCC)    Primary osteoarthritis of left hip    Chest pain    Essential hypertension    Dyslipidemia    Morbid obesity with BMI of 40.0-44.9, adult (HCC)    ANA on CPAP     1. Chest pain: Atypical  2. HTN  3. HLP  4. Morbid Obesity with BMI >40  5. ANA on home CPAP      RECOMMENDATIONS:  Stable and remains free of pain presently. Denies any recollection of having stents placed or invasive cardiac testing. States stress test done over 20 years ago. Stress ordered today. Await results. Will check echo to assess LVEF and valvular disease  Continue PO ASA, statin, ARB, & BB  BP elevated. Norvasc started today. Continue along with ARB & BB. Discussed importance of ambulatory BP monitoring. Stress test and echo as above for further risk stratification. If stress negative for ischemic and preserved LVEF on echo will be OK for discharge from CV standpoint with OP F/U in 2 weeks in clinic. Discussed with patient and Nurse. Jennifer Toure, APRN - [facility] Consult           [phone]      I reviewed the patient history personally, and examined by me during the visit. I have reviewed the H&P/Consult / Progress note as completed, and made appropriate changes to the patient exam and treatment plans.       I have reviewed the case in details including physical exam and treatment plan with resident / fellow / NP    Patient treatment plan was explained to patient, correction in notes was made as appropriate, and discussed final arrangement based on  my evaluation and exam.    Additional Recommendations:      Christina De Jesus MD  [facility]

## 2021-09-13 NOTE — PROGRESS NOTES
[facility]  Office: [address], DO, Cameronlibrado Germain, DO, Clay [place], DO, Dickson Rosa Blood, DO, Waleska Torres MD, Thong Lopes MD, Jennifer Mathew MD, Benigno Mckeon MD, Willis Goins MD, Jayme Smiley MD, Ward Dailey MD, Juan Luis Ordonez, DO, Clara Boswell, DO, Alan Ziegler MD,  Reza Mittal, DO, Sergio Long MD, Max Fritz MD, Shana Kapoor MD, Hudson Alexander MD, , Fran Elise MD, Filipe Cruz MD, Suzy Eubanks MD, Eliud Burnett, [facility], [facility], [facility], Akhil Daigle, CNP, Alisha Ortiz, CNS, Ramon Portillo, CNP, Henny Gandhi, CNP, Katelyn Rees, CNP, Haley Teresa, CNP, Reece Dutta, CNP, Abhijti Shields PA-C, Soraya Bangura, [facility], Khadijah Lee, CNP, Lieutenant Cardenas, CNP, Ritu Santillan, CNP, Keaton Rodriguez, CNP, Lopez Amaral, CNP, Tucker Kirby, CNP, Barney Gandhi, [facility], [facility]    Progress Note    9/13/2021    10:02 AM    Name:   Jody Infante  MRN:     [id number]     Acct:      [de-identified]   Room:   [address] [place] [address] Day:  1  Admit Date:  9/12/2021  5:45 AM    PCP:   Alex Aleman DO  Code Status:  Full Code    Subjective:     C/C:   Chief Complaint   Patient presents with    Chest Pain     Interval History Status: not changed. Patient was seen and examined at bedside this AM. Continues to complain of chest pain. Denies fever/chills, nausea/vomiting or shortness of breath. Awaiting results of Lexiscan. Brief History:     (per HPI) Jody Infante is a 78 y.o. Non- / non  female who presents with Chest Pain   and is admitted to the hospital for the management of Chest pain.     The patient presents to the hospital with complaint of chest pain. She states her chest pain started 1-2 days ago. She reports her chest pain is left sided with radiation to her left arm and neck.  She states her pain is intermittent, sharp and 6/10 in intensity at its peak. She endorses associated shortness of breath and diaphoresis. She denies fever, chills, nausea or vomiting. No additional symptomology or further modifying factors. She has past medical history that includes CVA, hypertension, dyslipidemia, ANA with cpap use and parkinsons disease. She takes plavix.      She lives at [address] [place] [address]. She does not have a cardiologist.      ProBNP 94, high sensitivity troponin 7, 6, 8. CXR negative for acute cardiopulmonary process.      EKG interpretation per ER physician:      \"EKG 5:51 AM sinus rhythm, rate 80 bpm, normal axis, normal intervals, no ST elevation or depression, T wave flattening in 2 and aVF, T wave inversion in V2, V3, V4, V5, V6, 1 and aVL, poor R wave progression, Q wave in aVR, no previous EKG for comparison\"     \"2nd EKG 8:07 AM sinus rhythm rate 79  QRS 88  no ST elevations.  Diffuse T wave inversions. No change from 1st EKG\"    Review of Systems:     Constitutional:  negative for chills, fevers, sweats  Respiratory:  negative for cough, dyspnea on exertion, shortness of breath, wheezing  Cardiovascular:  Positive for chest pain. Gastrointestinal:  negative for abdominal pain, constipation, diarrhea, nausea, vomiting  Neurological:  negative for dizziness, headache    Medications: Allergies:     Allergies   Allergen Reactions    Codeine     Erythromycin Base     Lisinopril     Penicillins     Phenobarbital     Tetanus-Diphtheria Toxoids Td     Tetracyclines & Related     Nickel Rash     Contact metal       Current Meds:   Scheduled Meds:    atorvastatin  10 mg Oral Nightly    amLODIPine  5 mg Oral Daily    ARIPiprazole  10 mg Oral Daily    carvedilol  12.5 mg Oral BID WC    clopidogrel  75 mg Oral Daily    DULoxetine  60 mg Oral Daily    hydrALAZINE  10 mg Oral 3 times per day    losartan  100 mg Oral Daily    pantoprazole  40 mg Oral QAM AC    sodium chloride flush  5-40 mL IntraVENous 2 times per day    aspirin  81 mg Oral Daily    enoxaparin  40 mg SubCUTAneous Daily     Continuous Infusions:    sodium chloride      sodium chloride       PRN Meds: albuterol sulfate HFA, sodium chloride flush, sodium chloride, albuterol sulfate HFA, atropine, nitroGLYCERIN, metoprolol, aminophylline, sodium chloride flush, sodium chloride, ondansetron **OR** ondansetron, acetaminophen **OR** acetaminophen, polyethylene glycol    Data:     Past Medical History:   has a past medical history of Arthritis, Asthma, Cerebral artery occlusion with cerebral infarction ([facility] [address] Depression, Gastric reflux, Hyperlipidemia, Hypertension, Parkinson disease ([facility] [address] Restless leg, Sleep apnea, and Urinary incontinence. Social History:   reports that she has never smoked. She does not have any smokeless tobacco history on file. She reports that she does not drink alcohol and does not use drugs. Family History:   Family History   Problem Relation Age of Onset    Stroke Mother     Heart Attack Father        Vitals:  BP (!) 124/94   Pulse 81   Temp 97.3 °F (36.3 °C) (Oral)   Resp 16   Ht 5' 2\" (1.575 m)   Wt 220 lb (99.8 kg)   SpO2 98%   BMI 40.24 kg/m²   Temp (24hrs), Av.8 °F (36.6 °C), Min:97.3 °F (36.3 °C), Max:98.1 °F (36.7 °C)    No results for input(s): POCGLU in the last 72 hours. I/O (24Hr):   No intake or output data in the 24 hours ending 21 1002    Labs:  Hematology:  Recent Labs     21  0555 21  0451   WBC 8.2 8.3   RBC 5.19 5.12*   HGB 15.4 14.9   HCT 46.6* 47.9*   MCV 89.9 93.6   MCH 29.7 29.1   MCHC 33.1 31.1   RDW 13.5 12.9    257   MPV 7.7 9.6   DDIMER 0.43  --      Chemistry:  Recent Labs     21  0555 21  0802 21  1327 21  2228 21  0114     --   --   --   --    K 3.8  --   --   --   --      --   --   --   --    CO2 27  --   --   --   --    GLUCOSE 129*  --   --   --   --    BUN 15  --   --   --   --    CREATININE 0.60  --   --   --   -- ANIONGAP 10  --   --   --   --    LABGLOM >60  --   --   --   --    GFRAA >60  --   --   --   --    CALCIUM 9.3  --   --   --   --    PROBNP 94  --   --   --   --    TROPHS 7   < > 8 6 <6    < > = values in this interval not displayed. No results for input(s): PROT, LABALBU, LABA1C, X5LSGKS, S9UDDFO, FT4, TSH, AST, ALT, LDH, GGT, ALKPHOS, LABGGT, BILITOT, BILIDIR, AMMONIA, AMYLASE, LIPASE, LACTATE, CHOL, HDL, LDLCHOLESTEROL, CHOLHDLRATIO, TRIG, VLDL, LRV46BU, PHENYTOIN, PHENYF, URICACID, POCGLU in the last 72 hours. ABG:No results found for: POCPH, PHART, PH, POCPCO2, WXY3BMS, PCO2, POCPO2, PO2ART, PO2, POCHCO3, DKI4FWK, HCO3, NBEA, PBEA, BEART, BE, THGBART, THB, ALV8YLL, SJVJ1KYH, S0LYXYWM, O2SAT, FIO2  No results found for: SPECIAL  No results found for: CULTURE    Radiology:  XR CHEST PORTABLE    Result Date: 9/12/2021  Normal examination.        Physical Examination:        General appearance:  alert, cooperative and no distress  Mental Status:  oriented to person, place and time and normal affect  Lungs:  clear to auscultation bilaterally, normal effort  Heart:  regular rate and rhythm, no murmur  Abdomen:  soft, nontender, nondistended, normal bowel sounds, no masses, hepatomegaly, splenomegaly  Extremities:  no edema, redness, tenderness in the calves  Skin:  no gross lesions, rashes, induration    Assessment:        Hospital Problems         Last Modified POA    * (Principal) Chest pain 9/12/2021 Yes    Parkinson's disease (tremor, stiffness, slow motion, unstable posture) ([facility] [address] [facility] 9/12/2021 Yes    Essential hypertension 9/12/2021 Yes    Dyslipidemia 9/12/2021 Yes    Morbid obesity with BMI of 40.0-44.9, adult ([facility] [address] [facility] 9/12/2021 Yes    ANA on CPAP 9/12/2021 Yes          Plan:        Unstable angina   -Lexiscan pending   -Consult cardiology; appreciate recommendations    -Continue aspirin   -Continue clopidogrel     HTN   -Continue home amlodipine 5 mg daily   -Continue carvedilol 12.5 mg bid   -Continue hydralazine 10 mg q8h   -Continue losartan 100 mg daily     HLD  -Increase home atorvastatin to 40 mg nightly     Depression   -Continue home aripiprazole 10 mg daily   -Continue home duloxetine 60 mg daily       Farzaneh Huitron MD  9/13/2021  10:02 AM

## 2021-09-13 NOTE — PROCEDURES
[address]                 [address] [facility], [facility]                              CARDIAC STRESS TEST    PATIENT NAME: Ayaan Hewitt                :        1955  MED REC NO:   [id number]                             ROOM:       1003  ACCOUNT NO:   [de-identified]                           ADMIT DATE: 2021  PROVIDER:     Larry Iraheta    DATE OF STUDY:  2021    LEXISCAN MYOVIEW STRESS TEST    ATTENDING PROVIDER:  Guy Smith MD    PRIMARY CARE PROVIDER:  Renzo Caceres DO    PERFORMING PHYSICIAN: Rodolfo Jack. Celina Infante MD    INDICATION:  Chest pain. HEART RATE  100% max predicted heart rate:  153  85% max predicted heart rate:  130  Duration:  1:00    Resting heart rate:  73  Maximum heart rate achieved:  109  % of predicted maximum:  71    BLOOD PRESSURE  Resting BP:  146/74  Peak BP:  148/63  METS:  1.0    MEDICATIONS GIVEN:  0.4 mg Lexiscan    REASON FOR TERMINATION:   Medication infusion complete. BASELINE EKG DEMONSTRATED:  Sinus rhythm. Diffuse nonspecific ST-T changes. During the stress test, the patient reported: No symptoms. STRESS EKG DEMONSTRATED:  No abnormal change. HEART RATE RESPONSE:   Normal response. BLOOD PRESSURE RESPONSE:   Normal response. ECG IMPRESSION:  Negative. FINAL IMPRESSION:  Negative. Nuclear medicine report to follow.         Victoriano Vega    D: 2021 9:50:15       T: 2021 9:54:30     [email or web address]  Job#: [id number]     Doc#: Unknown

## 2021-09-13 NOTE — PROGRESS NOTES
Transitions of Care Pharmacy Service   Medication Review    The patient's list of current home medications has been reviewed. Source(s) of information: [facility] nursing home medication list    Based on information provided by the above source(s), I have updated the patient's home med list as described below. Please review the ACTION REQUESTED section of this note below for any discrepancies on current hospital orders. I changed or updated the following medications on the patient's home medication list:  Removed Aripiprazole 10mg- 10mg QHS  Atorvastatin 20mg- 20mg QHS  Calcium carbonate 600mg- 600mg QD  Norco 5/325mg- 1 tablet Q8H prn  Duoneb  Losartan 50mg  Ropinirole 0.5mg  Trihexyphenidyl     Added Calcium/vitamin D 600/200 mg/unit- 1 tablet QD  Ingrezza 40mg- 40mg QD  Daily-heron- 1 tablet QD  Flonase nasal spray- 1 spray each nostril QD  Symbicort 160/4.5mg- 2 puffs BID  APAP 325mg- 650mg Q4-6H prn  Cetirizine 10mg- 10mg QD prn  Ibuprofen 400mg- 400mg Q6H prn     Adjusted   Sinemet 25/100mg- sig added, 1 tablet QID  Simvastatin 20mg- sig added, 20mg QHS  Vitamin D 400 units- 400 units QD changed to vitamin D 1,000 units- 1,000 units QD     Other Notes None         PROVIDER ACTION REQUESTED  Medications that need to be addressed by a physician/nurse practitioner:    Medication Action Requested   Aripiprazole  Ingrezza  Symbicort Discontinue  Restart home dose of 40mg QD if appropriate  Restart home dose of 160/4.5mg 2 puffs BID         Please feel free to call me with any questions about this encounter. Thank you.     Yvonne Zaldivar [address] [place] [address]   Transitions of Care Pharmacy Service  Phone:  [phone]  Fax: [phone]      Electronically signed by Yvonne Zaldivar [facility] on 9/13/2021 at 11:48 AM       Medications Prior to Admission: calcium carb-cholecalciferol (CALCIUM 600+D3) 600-200 MG-UNIT TABS tablet, Take 1 tablet by mouth daily  Valbenazine Tosylate (INGREZZA) 40 MG CAPS, Take 40 mg by mouth daily  Multiple Vitamin (DAILY NIKKI) TABS, Take 1 tablet by mouth daily  fluticasone (FLONASE) 50 MCG/ACT nasal spray, 1 spray by Each Nostril route daily  budesonide-formoterol (SYMBICORT) 160-4.5 MCG/ACT AERO, Inhale 2 puffs into the lungs 2 times daily  acetaminophen (TYLENOL) 325 MG tablet, Take 650 mg by mouth every 4-6 hours as needed for Pain  cetirizine (ZYRTEC) 10 MG tablet, Take 10 mg by mouth daily  ibuprofen (ADVIL;MOTRIN) 400 MG tablet, Take 400 mg by mouth every 6 hours as needed for Pain  simvastatin (ZOCOR) 20 MG tablet, Take 20 mg by mouth nightly   carbidopa-levodopa (SINEMET)  MG per tablet, Take 1 tablet by mouth 4 times daily   pantoprazole (PROTONIX) 40 MG tablet, TAKE ONE TABLET BY MOUTH ONCE DAILY FOR  90  DAYS  oxybutynin (DITROPAN-XL) 10 MG extended release tablet, TAKE ONE TABLET BY MOUTH ONCE DAILY  losartan (COZAAR) 100 MG tablet, TAKE ONE TABLET BY MOUTH ONCE DAILY  hydrALAZINE (APRESOLINE) 10 MG tablet, Take 10 mg by mouth 3 times daily   DULoxetine (CYMBALTA) 60 MG extended release capsule, duloxetine 60 mg capsule,delayed release  clopidogrel (PLAVIX) 75 MG tablet, TAKE ONE TABLET BY MOUTH ONCE DAILY  carvedilol (COREG) 12.5 MG tablet, TAKE ONE TABLET BY MOUTH TWICE A DAY WITH MORNING AND EVENING MEAL. albuterol sulfate HFA (PROAIR HFA) 108 (90 BASE) MCG/ACT inhaler, Inhale 2 puffs every 4 hours by inhalation route for 30 days.   amLODIPine (NORVASC) 5 MG tablet, TAKE ONE TABLET BY MOUTH ONCE DAILY  albuterol (PROVENTIL) (2.5 MG/3ML) 0.083% nebulizer solution, 3 mLs  Zoledronic Acid (RECLAST IV), Infuse intravenously

## 2021-09-13 NOTE — PROGRESS NOTES
Patient arrived via stretcher from [place] ED. VS obtained and head to toe assessment completed. Admission database initiated. Orders released. Patient denied any chest pain and is resting comfortably in bed. Call light given to pt and verbalized understanding of use.

## 2021-09-13 NOTE — PROGRESS NOTES
Patient tolerated lexiscan stress test without distress. Recovered on cart. Patient awaiting further testing.

## 2021-09-13 NOTE — PROGRESS NOTES
CLINICAL PHARMACY NOTE: MEDS TO BEDS    Total # of Prescriptions Filled: 1   The following medications were delivered to the patient:  · ATORVASTATIN 40 MG    Additional Documentation:

## 2021-09-13 NOTE — CARE COORDINATION
Case Management Initial Discharge Plan  Jones Jimenez,         Readmission Risk              Risk of Unplanned Readmission:  10             Met with:patient to discuss discharge plans. Information verified: address, contacts, phone number, , insurance Yes  PCP: Misty Fu DO  Date of last visit: 2020    Insurance Provider: [facility] [place]     Discharge Planning  Current Residence:  [facility]   Living Arrangements:  Alone       Home is [address] with no stairs and has elevator to 2nd floor where she stays   Support Systems:  Family Members       Current Services PTA: none  Agency: none      Patient able to perform ADL's:Assisted  DME in home:  Walker with seat. bipap   DME used to aid ambulation prior to admission:   walker  DME used during admission:  walker    Potential Assistance Needed:  N/A    Pharmacy: [facility] in [address] [place] [address] Medications:  No  Does patient want to participate in local refill/ meds to beds program?  No    Patient agreeable to home care: will think about it  Freedom of choice provided:  n/a      Type of Home Care Services:  None  Patient expects to be discharged to:   home     Prior SNF/Rehab Placement and Facility: [facility]   Agreeable to SNF/Rehab: No  [place] of choice provided: n/a   Evaluation: n/a    Expected Discharge date:  09/15/21  Follow Up Appointment: Best Day/ Time: Monday AM    Transportation provider: chiquis Tellez   Transportation arrangements needed for discharge: No    Discharge Plan:   Met with patient to complete a discharge plan. Patient lives in [place] in [address] [place] [address]. She has [facility] that help her as needed and assist with all showers. . They also pass her meds    Patient states she is up by herself with her rollator. She is for chest pain and anticipate discharge if stress test is negative. Did discuss home care and does not feel she needs it at this time but will continue to follow. Electronically signed by Bishnu Enamorado RN on 9/13/21 at 1:19 PM EDT

## 2021-09-13 NOTE — ACP (ADVANCE CARE PLANNING)
Advance Care Planning     Advance Care Planning Activator (Inpatient)  Conversation Note      Date of ACP Conversation: 9/13/2021     Conversation Conducted with: Patient with Decision Making Capacity    ACP Activator: Richie Robles RN        Health Care Decision Maker:     Current Designated Health Care Decision Maker:     Primary Decision Maker: Zander Lenz - Brother/Sister - [phone]    Secondary Decision Maker: Moises Silver - Brother/Sister - [phone]  Click here to complete Healthcare Decision Makers including section of the Healthcare Decision Maker Relationship (ie \"Primary\")  Today we documented Decision Maker(s) consistent with Legal Next of Kin hierarchy. Care Preferences    Ventilation: \"If you were in your present state of health and suddenly became very ill and were unable to breathe on your own, what would your preference be about the use of a ventilator (breathing machine) if it were available to you? \"      Would the patient desire the use of ventilator (breathing machine)?: yes    \"If your health worsens and it becomes clear that your chance of recovery is unlikely, what would your preference be about the use of a ventilator (breathing machine) if it were available to you? \"     Would the patient desire the use of ventilator (breathing machine)?: Yes      Resuscitation  \"CPR works best to restart the heart when there is a sudden event, like a heart attack, in someone who is otherwise healthy. Unfortunately, CPR does not typically restart the heart for people who have serious health conditions or who are very sick. \"    \"In the event your heart stopped as a result of an underlying serious health condition, would you want attempts to be made to restart your heart (answer \"yes\" for attempt to resuscitate) or would you prefer a natural death (answer \"no\" for do not attempt to resuscitate)? \" yes       [x] Yes   [] No   Educated Patient / Decision Maker regarding differences between Advance Directives and portable DNR orders. Length of ACP Conversation in minutes:      Conversation Outcomes:  [x] ACP discussion completed  [] Existing advance directive reviewed with patient; no changes to patient's previously recorded wishes  [] New Advance Directive completed  [] Portable Do Not Rescitate prepared for Provider review and signature  [] POLST/POST/MOLST/MOST prepared for Provider review and signature      Follow-up plan:    [x] Schedule follow-up conversation to continue planning  [x] Referred individual to Provider for additional questions/concerns   [] Advised patient/agent/surrogate to review completed ACP document and update if needed with changes in condition, patient preferences or care setting    [] This note routed to one or more involved healthcare providers        Call to pastoral care to have medical POA paperwork put in place.

## 2021-09-13 NOTE — PROGRESS NOTES
Writer paged Dr. Bam Gutierrez with new consult. Dr. Bam Gutierrez updated on pt labs, meds, and vitals. Stress test ordered for tomorrow. Pt NPO.

## 2021-09-13 NOTE — RT PROTOCOL NOTE
RT Inhaler-Nebulizer Bronchodilator Protocol Note    There is a bronchodilator order in the chart from a provider indicating to follow the RT Bronchodilator Protocol and there is an Initiate RT Bronchodilator Protocol order as well (see protocol at bottom of note). The findings from the last RT Protocol Assessment were as follows:  Smoking: Non smoker  Surgical Status: No surgery  Xray: Clear  Respiratory Pattern: RR 12-20  Mental Status: Alert and Oriented  Breath Sounds: Diminished and/or crackles  Cough: Strong, spontaneous, non-productive  Activity Level: Walking with assistance (Pt requires walker due to Parkinsons and past stroke.)  Oxygen Requirement: Room Air - 2LNC/28% or home setting  Indication for Bronchodilator Therapy: On home bronchodilators (Takes an Albuterol MDI PRN at home)  Bronchodilator Assessment Score: 1    Aerosolized bronchodilator medication orders have been revised according to the RT Bronchodilator Protocol. RT Inhaler-Nebulizer Bronchodilator Protocol:    Respiratory Therapist to perform RT Therapy Protocol Assessment then follow the protocol. No Indications - adjust the frequency to every 6 hours PRN wheezing or bronchospasm, if no treatments needed after 48 hours then discontinue using Per Protocol order mode. If indication present, adjust the RT bronchodilator orders based on the Bronchodilator Assessment Score as follows:    0-6 - enter or revise RT bronchodilator order to Albuterol Inhaler order with frequency of every 2 hours PRN for wheezing or increased work of breathing using Per Protocol order mode. If Albuterol Inhaler not tolerated or not effective, then discontinue the Albuterol Inhaler order and enter Albuterol Nebulizer order with same frequency and PRN reasons. Repeat RT Therapy Protocol Assessment as needed.     7-10 - discontinue any other Inpatient aerosolized bronchodilator medication orders and enter or revise two Albuterol Inhaler orders, one with BID frequency and one with frequency of every 2 hours PRN wheezing or increased work of breathing using Per Protocol order mode. Repeat RT Therapy Protocol Assessment with second treatment then BID and as needed. If Albuterol Inhaler not tolerated or not effective, then discontinue the Albuterol Inhaler orders and enter two Albuterol Nebulizer orders with same frequencies and PRN reasons. 11-13 - discontinue any other Inpatient aerosolized bronchodilator medication orders and enter DuoNeb Nebulizer orders QID frequency and an Albuterol Nebulizer order every 2 hours PRN wheezing or increased work of breathing using Per Protocol order mode. Repeat RT Therapy Protocol Assessment with second treatment then QID and as needed. Greater than 13 - discontinue any other Inpatient bronchodilator aerosolized medication orders and enter DuoNeb Nebulizer order every 4 hours frequency and Albuterol Nebulizer every 2 hours PRN wheezing or increased work of breathing using Per Protocol order mode. Repeat RT Therapy Protocol Assessment with second treatment then every 4 hours and as needed. RT to enter RT Home Evaluation for COPD & MDI Assessment order using Per Protocol order mode.     Electronically signed by Jessie Camacho RCP on 9/13/2021 at 5:10 AM

## 2021-09-13 NOTE — FLOWSHEET NOTE
Advance Care Planning     Advance Care Planning Inpatient Note  Spiritual Care Department    Today's Date: 2021  Unit: [facility]    Received request from [employer]. Upon review of chart and communication with care team, patient's decision making abilities are not in question. . Patient was/were present in the room during visit. Goals of ACP Conversation:  Discuss advance care planning documents    Health Care Decision Makers:       Primary Decision Maker: Devendra Wu - Brother/Sister - [phone]    Secondary Decision Maker: Adrien Garcia - Brother/Sister - [phone]    Summary:  Nick Pam    Advance Care Planning Documents (Patient Wishes):  None     Assessment:  Patient resting in bed; states her  sister is named in her current Healthcare POA; declines to update documents at this time; reviews information on [address] for Decision Making; requests her brother and remaining siblings be her Decision Maker. Writer provides information on hierarchy; leaves documents with patient; invites patient to return to the hospital post-discharge to complete documents if she chooses. Writer provides listening presence, supportive conversation, prayer, and encouragement. Patient expresses gratitude for visit, information, and support. Spiritual Care will follow as needed.     Interventions:  Discussed and provided education on state decision maker hierarchy  Encouraged ongoing ACP conversation with future decision makers and loved ones  Deferred conversation as patient not interested in completing an advance directive at this time    Care Preferences Communicated:   No    Outcomes/Plan:  None    Electronically signed by Anna Marie Cabral, [address] on 2021 at 1:21 PM

## 2021-09-13 NOTE — PROGRESS NOTES
Discharge information given and explained to pt and pt brother. Pt verbalized understanding. Pt discharged home with all documented belongings.

## 2021-09-13 NOTE — H&P
[facility]  Office: [address], DO, Jamey Warren, DO, Jenny Scherer, DO, Ruby Maliksandra Blood, DO, Herman Dubose MD, Dianne Berrios MD, Raúl Singh MD, Murphy Monsivais MD, Krishna Haney MD, Deya Granado MD, Kayla Abel MD, Reji Coe, DO, Hugo Chahal, DO, Rebecca Rasmussen MD,  Alejandro Valenzuela, DO, Rachel Pimentel MD, Osmany Zambrano MD, Brandi Paris MD, Guy Smith MD, , Bianca Bright MD, Wen Nuñez MD, Joya Gonzalez MD, Angie Malcolm, [facility], [address], [facility], Franklin Leavitt, CNP, Adonay Painting, CNS, Noah Barthel, CNP, Judy Colorado, CNP, Rodney Fu, CNP, Daniela Abad, CNP, Sandra Narayan, CNP, Judy Hayes PA-C, [facility], [facility], Kolby Mckeon, CNP, Dina Holloway, CNP, Hilton Shannon, CNP, Oscar Grant, CNP, Mayito Jefferson, CNP, Bob Goyal, CNP, Carlos Fuchs, CNP, Suzy Carlton, CNP         [facility]    HISTORY AND PHYSICAL EXAMINATION            Date:   9/12/2021  Patient name:  Julian Andre  Date of admission:  9/12/2021  5:45 AM  MRN:   [id number]  Account:  [de-identified]  YOB: 1955  PCP:    Madhavi Loo DO  Room:   1003/1003-02  Code Status:    Full Code    Chief Complaint:     Chief Complaint   Patient presents with    Chest Pain     History Obtained From:     Patient and electronic medical record. History of Present Illness:     Julian Andre is a 78 y.o. Non- / non  female who presents with Chest Pain   and is admitted to the hospital for the management of Chest pain. The patient presents to the hospital with complaint of chest pain. She states her chest pain started 1-2 days ago. She reports her chest pain is left sided with radiation to her left arm and neck. She states her pain is intermittent, sharp and 6/10 in intensity at its peak. She endorses associated shortness of breath and diaphoresis. She denies fever, chills, nausea or vomiting. No additional symptomology or further modifying factors. She has past medical history that includes CVA, hypertension, dyslipidemia, ANA with cpap use and parkinsons disease. She takes plavix. She lives at [address] [place] [address]. She does not have a cardiologist.     ProBNP 94, high sensitivity troponin 7, 6, 8. CXR negative for acute cardiopulmonary process. EKG interpretation per ER physician: \"EKG 5:51 AM sinus rhythm, rate 80 bpm, normal axis, normal intervals, no ST elevation or depression, T wave flattening in 2 and aVF, T wave inversion in V2, V3, V4, V5, V6, 1 and aVL, poor R wave progression, Q wave in aVR, no previous EKG for comparison\"    \"2nd EKG 8:07 AM sinus rhythm rate 79  QRS 88  no ST elevations. Diffuse T wave inversions. No change from 1st EKG\"    Past Medical History:     Past Medical History:   Diagnosis Date    Arthritis     Asthma     Cerebral artery occlusion with [facility] infarction [facility]     Depression     Gastric reflux     Hyperlipidemia     Hypertension     Parkinson disease ([facility] [facility]     Restless leg     Sleep apnea     Urinary incontinence         Past Surgical History:     Past Surgical History:   Procedure Laterality Date    ADENOIDECTOMY      CARDIAC CATHETERIZATION      FOOT SURGERY      TONSILLECTOMY      WRIST SURGERY      carpel tunnel surgery bilateral        Medications Prior to Admission:     Prior to Admission medications    Medication Sig Start Date End Date Taking?  Authorizing Provider   losartan (COZAAR) 50 MG tablet losartan 50 mg tablet    Historical Provider, MD   oxybutynin (DITROPAN-XL) 10 MG extended release tablet Take 10 mg by mouth daily    Historical Provider, MD   pantoprazole (PROTONIX) 40 MG tablet Take 40 mg by mouth daily    Historical Provider, MD   simvastatin (ZOCOR) 20 MG tablet  8/26/21   Historical Provider, MD   vitamin D3 (CHOLECALCIFEROL) 10 MCG (400 UNIT) TABS tablet Take 400 Units by mouth daily Historical Provider, MD   carvedilol (COREG) 12.5 MG tablet Take 12.5 mg by mouth 2 times daily (with meals)    Historical Provider, MD   albuterol (PROVENTIL) (2.5 MG/3ML) 0.083% nebulizer solution 3 mLs    Historical Provider, MD   ipratropium-albuterol (DUONEB) 0.5-2.5 (3) MG/3ML SOLN nebulizer solution 3 mLs    Historical Provider, MD   carbidopa-levodopa (SINEMET)  MG per tablet  8/26/21   Historical Provider, MD   rOPINIRole (REQUIP) 0.5 MG tablet ropinirole 0.5 mg tablet    Historical Provider, MD   pantoprazole (PROTONIX) 40 MG tablet TAKE ONE TABLET BY MOUTH ONCE DAILY FOR  90  DAYS    Historical Provider, MD   oxybutynin (DITROPAN-XL) 10 MG extended release tablet TAKE ONE TABLET BY MOUTH ONCE DAILY    Historical Provider, MD   losartan (COZAAR) 100 MG tablet TAKE ONE TABLET BY MOUTH ONCE DAILY    Historical Provider, MD   hydrALAZINE (APRESOLINE) 10 MG tablet hydralazine 10 mg tablet    Historical Provider, MD   DULoxetine (CYMBALTA) 60 MG extended release capsule duloxetine 60 mg capsule,delayed release    Historical Provider, MD   clopidogrel (PLAVIX) 75 MG tablet TAKE ONE TABLET BY MOUTH ONCE DAILY    Historical Provider, MD   carvedilol (COREG) 12.5 MG tablet TAKE ONE TABLET BY MOUTH TWICE A DAY WITH MORNING AND EVENING MEAL. Historical Provider, MD   atorvastatin (LIPITOR) 20 MG tablet TAKE ONE TABLET BY MOUTH ONCE DAILY AT BEDTIME    Historical Provider, MD   ARIPiprazole (ABILIFY) 10 MG tablet Take 1 tablet every day by oral route. Historical Provider, MD   albuterol sulfate HFA (PROAIR HFA) 108 (90 BASE) MCG/ACT inhaler Inhale 2 puffs every 4 hours by inhalation route for 30 days. Historical Provider, MD   HYDROcodone-acetaminophen (NORCO) 5-325 MG per tablet Take 1 tablet every 8 hours by oral route as needed.     Historical Provider, MD   amLODIPine (NORVASC) 5 MG tablet TAKE ONE TABLET BY MOUTH ONCE DAILY    Historical Provider, MD   albuterol (PROVENTIL) (2.5 MG/3ML) 0.083% nebulizer solution 3 mLs    Historical Provider, MD   Zoledronic Acid (RECLAST IV) Infuse intravenously    Historical Provider, MD   Calcium Carbonate (CALCIUM 600 PO) Take by mouth    Historical Provider, MD   trihexyphenidyl (ARTANE) 2 MG tablet Take 2 mg by mouth    Historical Provider, MD        Allergies:     Codeine, Erythromycin base, Lisinopril, Penicillins, Phenobarbital, Tetanus-diphtheria toxoids td, Tetracyclines & related, and Nickel    Social History:     Tobacco:    reports that she has never smoked. She does not have any smokeless tobacco history on file. Alcohol:      reports no history of alcohol use. Drug Use:  reports no history of drug use. Family History:     Family History   Problem Relation Age of Onset    Stroke Mother     Heart Attack Father        Review of Systems:     Positive and Negative as described in HPI. Review of Systems   Constitutional: Positive for diaphoresis. Negative for chills and fever. HENT: Negative for congestion. Eyes: Negative for visual disturbance. Respiratory: Positive for shortness of breath. Negative for cough and wheezing. Cardiovascular: Positive for chest pain. Negative for palpitations and leg swelling. Gastrointestinal: Negative for abdominal pain, blood in stool, constipation, diarrhea, nausea and vomiting. Endocrine: Negative for cold intolerance and heat intolerance. Genitourinary: Negative for difficulty urinating, dysuria, frequency and urgency. Musculoskeletal: Negative for arthralgias and myalgias. Skin: Negative for color change and rash. Neurological: Negative for dizziness, weakness, light-headedness, numbness and headaches. Hematological: Does not bruise/bleed easily. Psychiatric/Behavioral: The patient is not nervous/anxious. All other systems reviewed and are negative.     Physical Exam:   BP (!) 145/123   Pulse 87   Temp 97.7 °F (36.5 °C) (Oral)   Resp 16   Ht 5' 2\" (1.575 m)   Wt 220 lb (99.8 kg) SpO2 96%   BMI 40.24 kg/m²   Temp (24hrs), Av.8 °F (36.6 °C), Min:97.7 °F (36.5 °C), Max:97.9 °F (36.6 °C)    No results for input(s): POCGLU in the last 72 hours. No intake or output data in the 24 hours ending 21 9626    Physical Exam  Vitals and nursing note reviewed. Constitutional:       Appearance: She is obese. She is not diaphoretic. HENT:      Head: Normocephalic and atraumatic. Right Ear: Hearing normal.      Left Ear: Hearing normal.      Nose: Nose normal. No rhinorrhea. Eyes:      General: Lids are normal.      Extraocular Movements:      Right eye: Normal extraocular motion. Left eye: Normal extraocular motion. Conjunctiva/sclera: Conjunctivae normal.      Right eye: Right conjunctiva is not injected. Left eye: Left conjunctiva is not injected. Pupils: Pupils are equal, round, and reactive to light. Pupils are equal.      Right eye: Pupil is reactive. Left eye: Pupil is reactive. Neck:      Thyroid: No thyromegaly. Trachea: Trachea normal. No tracheal deviation. Cardiovascular:      Rate and Rhythm: Normal rate and regular rhythm. Pulses: Normal pulses. Heart sounds: Normal heart sounds. Pulmonary:      Effort: Pulmonary effort is normal.      Breath sounds: Examination of the right-lower field reveals decreased breath sounds. Examination of the left-lower field reveals decreased breath sounds. Decreased breath sounds present. Abdominal:      General: Bowel sounds are normal. There is no distension. Palpations: Abdomen is soft. There is no mass. Tenderness: There is no abdominal tenderness. There is no guarding. Musculoskeletal:         General: No tenderness. Cervical back: Neck supple. Right lower le+ Edema present. Left lower le+ Edema present. Skin:     General: Skin is warm and dry. Neurological:      Mental Status: She is alert and oriented to person, place, and time.       Cranial Nerves: No cranial nerve deficit. Motor: Tremor present. Psychiatric:         Speech: Speech normal.         Behavior: Behavior normal. Behavior is cooperative.          Investigations:      Laboratory Testing:  Recent Results (from the past 24 hour(s))   EKG 12 Lead    Collection Time: 09/12/21  5:51 AM   Result Value Ref Range    Ventricular Rate 80 BPM    Atrial Rate 80 BPM    P-R Interval 138 ms    QRS Duration 84 ms    Q-T Interval 412 ms    QTc Calculation (Bazett) 475 ms    P Axis 21 degrees    R Axis -16 degrees    T Axis -174 degrees   CBC Auto Differential    Collection Time: 09/12/21  5:55 AM   Result Value Ref Range    WBC 8.2 3.5 - 11.0 k/uL    RBC 5.19 4.0 - 5.2 m/uL    Hemoglobin 15.4 12.0 - 16.0 g/dL    Hematocrit 46.6 (H) 36 - 46 %    MCV 89.9 80 - 100 fL    MCH 29.7 26 - 34 pg    MCHC 33.1 31 - 37 g/dL    RDW 13.5 12.5 - 15.4 %    Platelets 596 510 - 660 k/uL    MPV 7.7 6.0 - 12.0 fL    NRBC Automated NOT REPORTED per 100 WBC    Differential Type NOT REPORTED     Seg Neutrophils 56 36 - 66 %    Lymphocytes 30 24 - 44 %    Monocytes 9 2 - 11 %    Eosinophils % 4 1 - 4 %    Basophils 1 0 - 2 %    Immature Granulocytes NOT REPORTED 0 %    Segs Absolute 4.60 1.8 - 7.7 k/uL    Absolute Lymph # 2.40 1.0 - 4.8 k/uL    Absolute Mono # 0.70 0.1 - 1.2 k/uL    Absolute Eos # 0.30 0.0 - 0.4 k/uL    Basophils Absolute 0.10 0.0 - 0.2 k/uL    Absolute Immature Granulocyte NOT REPORTED 0.00 - 0.30 k/uL    WBC Morphology NOT REPORTED     RBC Morphology NOT REPORTED     Platelet Estimate NOT REPORTED    Basic Metabolic Panel w/ Reflex to MG    Collection Time: 09/12/21  5:55 AM   Result Value Ref Range    Glucose 129 (H) 70 - 99 mg/dL    BUN 15 8 - 23 mg/dL    CREATININE 0.60 0.50 - 0.90 mg/dL    Bun/Cre Ratio NOT REPORTED 9 - 20    Calcium 9.3 8.6 - 10.4 mg/dL    Sodium 140 135 - 144 mmol/L    Potassium 3.8 3.7 - 5.3 mmol/L    Chloride 103 98 - 107 mmol/L    CO2 27 20 - 31 mmol/L    Anion Gap 10 9 - 17 mmol/L    GFR Non-African American >60 >60 mL/min    GFR African American >60 >60 mL/min    GFR Comment          GFR Staging NOT REPORTED    Troponin    Collection Time: 09/12/21  5:55 AM   Result Value Ref Range    Troponin, High Sensitivity 7 0 - 14 ng/L    Troponin T NOT REPORTED <0.03 ng/mL    Troponin Interp NOT REPORTED    Brain Natriuretic Peptide    Collection Time: 09/12/21  5:55 AM   Result Value Ref Range    Pro-BNP 94 <300 pg/mL    BNP Interpretation Pro-BNP Reference Range:    D-Dimer, Quantitative    Collection Time: 09/12/21  5:55 AM   Result Value Ref Range    D-Dimer, Quant 0.43 mg/L FEU   Troponin    Collection Time: 09/12/21  8:02 AM   Result Value Ref Range    Troponin, High Sensitivity 6 0 - 14 ng/L    Troponin T NOT REPORTED <0.03 ng/mL    Troponin Interp NOT REPORTED    EKG 12 Lead    Collection Time: 09/12/21  8:07 AM   Result Value Ref Range    Ventricular Rate 79 BPM    Atrial Rate 79 BPM    P-R Interval 134 ms    QRS Duration 88 ms    Q-T Interval 392 ms    QTc Calculation (Bazett) 449 ms    P Axis 20 degrees    R Axis -15 degrees    T Axis 175 degrees   Troponin    Collection Time: 09/12/21  1:27 PM   Result Value Ref Range    Troponin, High Sensitivity 8 0 - 14 ng/L    Troponin T NOT REPORTED <0.03 ng/mL    Troponin Interp NOT REPORTED        Imaging/Diagnostics:  XR CHEST PORTABLE    Result Date: 9/12/2021  Normal examination. Assessment :      Hospital Problems         Last Modified POA    * (Principal) Chest pain 9/12/2021 Yes    Parkinson's disease (tremor, stiffness, slow motion, unstable posture) ([address] 9/12/2021 Yes    Essential hypertension 9/12/2021 Yes    Dyslipidemia 9/12/2021 Yes    Morbid obesity with BMI of 40.0-44.9, adult ([address] 9/12/2021 Yes    ANA on CPAP 9/12/2021 Yes        Plan:     Patient status observation in the  Med/Surge unit. 1. Consult cardiology, appreciate recommendations. 2. Repeat EKG in AM.  3. Trend troponin.   4. Medication reconciliation in process, patient is unsure of meds. RN to contact [facility] [employer]. 5. Hypertension- continue home antihypertensive agents. 6. Dyslipidemia- continue atorvastatin. 7. ANA- utilize home cpap. 8. Obesity- lifestyle modifications. 9. Monitor vital signs. 10. Follow chemistries. 11. DVT prophylaxis. 12. Supplemental oxygen as needed. 13. Telemetry. 14. NPO after midnight.    15. Activity as tolerated with assist.     Consultations:   [id number] Juan Rodriguez, MENG - CNP  9/12/2021  10:36 PM    Copy sent to Dr. Anayeli Blank DO

## 2021-09-22 ENCOUNTER — HOSPITAL ENCOUNTER (OUTPATIENT)
Dept: GENERAL RADIOLOGY | Age: 66
Discharge: HOME | End: 2021-09-24
Payer: MEDICARE

## 2021-09-22 ENCOUNTER — HOSPITAL ENCOUNTER (OUTPATIENT)
Age: 66
Discharge: HOME | End: 2021-09-24
Payer: MEDICARE

## 2021-09-22 DIAGNOSIS — S80.02XA CONTUSION OF LEFT KNEE, INITIAL ENCOUNTER: ICD-10-CM

## 2021-09-22 PROCEDURE — 73562 X-RAY EXAM OF KNEE 3: CPT

## 2021-09-27 ENCOUNTER — TELEPHONE (OUTPATIENT)
Dept: ORTHOPEDIC SURGERY | Age: 66
End: 2021-09-27

## 2021-09-27 NOTE — TELEPHONE ENCOUNTER
[place]   [facility] [place] for this Patient.

## 2022-03-23 ENCOUNTER — HOSPITAL ENCOUNTER (OUTPATIENT)
Dept: GENERAL RADIOLOGY | Age: 67
Discharge: HOME | End: 2022-03-25
Payer: MEDICARE

## 2022-03-23 ENCOUNTER — HOSPITAL ENCOUNTER (OUTPATIENT)
Dept: PREADMISSION TESTING | Age: 67
Discharge: HOME | End: 2022-03-27
Payer: MEDICARE

## 2022-03-23 VITALS
OXYGEN SATURATION: 97 % | BODY MASS INDEX: 39.93 KG/M2 | DIASTOLIC BLOOD PRESSURE: 64 MMHG | TEMPERATURE: 97.3 F | HEART RATE: 71 BPM | WEIGHT: 217 LBS | SYSTOLIC BLOOD PRESSURE: 141 MMHG | HEIGHT: 62 IN | RESPIRATION RATE: 20 BRPM

## 2022-03-23 LAB
ANION GAP SERPL CALCULATED.3IONS-SCNC: 10 MMOL/L (ref 9–17)
BUN BLDV-MCNC: 15 MG/DL (ref 8–23)
CHLORIDE BLD-SCNC: 104 MMOL/L (ref 98–107)
CO2: 26 MMOL/L (ref 20–31)
CREAT SERPL-MCNC: 0.88 MG/DL (ref 0.5–0.9)
GFR AFRICAN AMERICAN: >60 ML/MIN
GFR NON-AFRICAN AMERICAN: >60 ML/MIN
GFR SERPL CREATININE-BSD FRML MDRD: NORMAL ML/MIN/{1.73_M2}
HCT VFR BLD CALC: 45.7 % (ref 36.3–47.1)
HEMOGLOBIN: 14.5 G/DL (ref 11.9–15.1)
MCH RBC QN AUTO: 29.4 PG (ref 25.2–33.5)
MCHC RBC AUTO-ENTMCNC: 31.7 G/DL (ref 28.4–34.8)
MCV RBC AUTO: 92.5 FL (ref 82.6–102.9)
NRBC AUTOMATED: 0 PER 100 WBC
PDW BLD-RTO: 13.1 % (ref 11.8–14.4)
PLATELET # BLD: 296 K/UL (ref 138–453)
PMV BLD AUTO: 9.3 FL (ref 8.1–13.5)
POTASSIUM SERPL-SCNC: 4.1 MMOL/L (ref 3.7–5.3)
RBC # BLD: 4.94 M/UL (ref 3.95–5.11)
SODIUM BLD-SCNC: 140 MMOL/L (ref 135–144)
WBC # BLD: 8.3 K/UL (ref 3.5–11.3)

## 2022-03-23 PROCEDURE — 71046 X-RAY EXAM CHEST 2 VIEWS: CPT

## 2022-03-23 PROCEDURE — 93005 ELECTROCARDIOGRAM TRACING: CPT | Performed by: ANESTHESIOLOGY

## 2022-03-23 PROCEDURE — 80051 ELECTROLYTE PANEL: CPT

## 2022-03-23 PROCEDURE — 82565 ASSAY OF CREATININE: CPT

## 2022-03-23 PROCEDURE — 85027 COMPLETE CBC AUTOMATED: CPT

## 2022-03-23 PROCEDURE — 84520 ASSAY OF UREA NITROGEN: CPT

## 2022-03-23 PROCEDURE — 93005 ELECTROCARDIOGRAM TRACING: CPT | Performed by: SURGERY

## 2022-03-23 PROCEDURE — 36415 COLL VENOUS BLD VENIPUNCTURE: CPT

## 2022-03-23 RX ORDER — BUPROPION HYDROCHLORIDE 150 MG/1
150 TABLET, EXTENDED RELEASE ORAL 2 TIMES DAILY
COMMUNITY

## 2022-03-23 NOTE — PRE-PROCEDURE INSTRUCTIONS
ARRIVE  [place] [facility] Monday, April 4, 2022 at 9:00 AM    Once you enter the hospital lobby, take the elevators to the second floor. Check-In is at the surgery registration desk    Continue to take your home medications as you normally do up to and including the night before surgery with the exception of any blood thinning medications. Please stop any blood thinning medications as directed by your surgeon or prescribing physician. Failure to stop certain medications may interfere with your scheduled surgery. These may include:  Aspirin, Warfarin (Coumadin), Clopidogrel (Plavix), Ibuprofen (Motrin, Advil), Naproxen (Aleve), Meloxicam (Mobic), Celecoxib (Celebrex), Eliquis, Pradaxa, Xarelto, Effient, Fish Oil, Herbal supplements. STOP PLAVIX 5 DAYS PRIOR TO SURGERY     Please take the following medication(s) the day of surgery with a small sip of water: AMLODIPINE, CARBIDOPA, HYDRALAZINE, CARVEDILOL    Please use your inhaler(s) if needed and bring your inhaler(s) from home the day of surgery. PREPARING FOR YOUR SURGERY:     Before surgery, you can play an important role in your own health. Because skin is not sterile, we need to be sure that your skin is as free of germs as possible before surgery by carefully washing before surgery. Preparing or prepping skin before surgery can reduce the risk of a surgical site infection.   Do not shave the area of your body where your surgery will be performed unless you received specific permission from your physician. You will need to shower at home the night before surgery and the morning of surgery with a special soap called chlorhexidine gluconate (CHG*). *Not to be used by people allergic to Chlorhexidine Gluconate (CHG). Following these instructions will help you be sure that your skin is clean before surgery. Instructions on cleaning your skin before surgery:      The night before your surgery:      You will need to shower with warm water (not hot) and the CHG soap.  Use a clean wash cloth and a clean towel. Have clean clothes available to put on after the shower.   First wash your hair with regular shampoo. Rinse your hair and body thoroughly to remove the shampoo.  Wash your face and genital area (private parts) with your regular soap or water only. Thoroughly rinse your body with warm water from the neck down.  Turn water off to prevent rinsing the soap off too soon.  With a clean wet washcloth and half of the CHG soap in the bottle, lather your entire body from the neck down. Do not use CHG soap near your eyes or ears to avoid injury to those areas.  Wash thoroughly, paying special attention to the area where your surgery will be performed.  Wash your body gently for five (5) minutes. Avoid scrubbing your skin too hard.  Turn the water back on and rinse your body thoroughly.  Pat yourself dry with a clean, soft towel. Do not apply lotion, cream or powder.  Dress with clean freshly washed clothes. The morning of surgery:     Repeat shower following steps above - using remaining half of CHG soap in bottle. Patient Instructions:    [facility] If you are having any type of anesthesia you are to have nothing to eat or drink after midnight the night before your surgery. This includes gum, hard candy, mints, water or smoking or chewing tobacco.  The only exception to this is a small sip of water to take with any morning dose of heart, blood pressure, or seizure medications. No alcoholic beverages for 24 hours prior to surgery.  Brush your teeth but do not swallow water.  Bring your eye glasses and case with you. No contacts are to be worn the day of surgery. You also may bring your hearing aids. Most surgical procedures involving anesthesia will require that you remove your dentures prior to surgery. · Do not wear any jewelry or body piercings day of surgery.   Also, NO lotion, perfume or deodorant to be used the day of surgery. No nail polish on the operative extremity (arm/leg surgeries)    · If you are staying overnight with us, please bring a small bag of necessary personal items.  Please wear loose, comfortable clothing. If you are potentially going to have a cast or brace bring clothing that will fit over them.  In case of illness - If you have cold or flu like symptoms (high fever, runny nose, sore throat, cough, etc.) rash, nausea, vomiting, loose stools, and/or recent contact with someone who has a contagious disease (chicken pox, measles, etc.) Please call your doctor before coming to the hospital.         Day of Surgery/Procedure:    As a patient at [facility] - INPATIENT you can expect quality medical and nursing care that is centered on your individual needs. Our goal is to make your surgical experience as comfortable as possible    . Transportation After Your Surgery/Procedure: You will need a friend or family member to drive you home after your procedure. Your  must be 25years of age or older and able to sign off on your discharge instructions. A taxi cab or any other form of public transportation is not acceptable. Your friend or family member must stay at the hospital throughout your procedure. Someone must remain with you for the first 24 hours after your surgery if you receive anesthesia or medication. If you do not have someone to stay with you, your procedure may be cancelled.       If you have any other questions regarding your procedure or the day of surgery, please call [phone]      _________________________  ____________________________  Signature (Patient)              Signature (Provider)               Date

## 2022-03-24 LAB
EKG ATRIAL RATE: 69 BPM
EKG ATRIAL RATE: 77 BPM
EKG P AXIS: 13 DEGREES
EKG P AXIS: 46 DEGREES
EKG P-R INTERVAL: 130 MS
EKG P-R INTERVAL: 134 MS
EKG Q-T INTERVAL: 408 MS
EKG Q-T INTERVAL: 418 MS
EKG QRS DURATION: 90 MS
EKG QRS DURATION: 90 MS
EKG QTC CALCULATION (BAZETT): 447 MS
EKG QTC CALCULATION (BAZETT): 461 MS
EKG R AXIS: -25 DEGREES
EKG R AXIS: -5 DEGREES
EKG T AXIS: -41 DEGREES
EKG T AXIS: 1 DEGREES
EKG VENTRICULAR RATE: 69 BPM
EKG VENTRICULAR RATE: 77 BPM

## 2022-03-24 PROCEDURE — 93010 ELECTROCARDIOGRAM REPORT: CPT | Performed by: INTERNAL MEDICINE

## 2022-03-24 RX ORDER — CLINDAMYCIN PHOSPHATE 900 MG/50ML
900 INJECTION INTRAVENOUS ONCE
Status: CANCELLED | OUTPATIENT
Start: 2022-04-04

## 2022-04-04 ENCOUNTER — APPOINTMENT (OUTPATIENT)
Dept: MAMMOGRAPHY | Age: 67
End: 2022-04-04
Payer: MEDICARE

## 2022-05-09 ENCOUNTER — APPOINTMENT (OUTPATIENT)
Dept: MAMMOGRAPHY | Age: 67
End: 2022-05-09
Attending: SURGERY
Payer: MEDICARE

## 2022-05-09 ENCOUNTER — HOSPITAL ENCOUNTER (OUTPATIENT)
Dept: NUCLEAR MEDICINE | Age: 67
Discharge: HOME | End: 2022-05-11
Payer: MEDICARE

## 2022-05-09 ENCOUNTER — ANESTHESIA EVENT (OUTPATIENT)
Dept: OPERATING ROOM | Age: 67
End: 2022-05-09
Payer: MEDICARE

## 2022-05-09 ENCOUNTER — HOSPITAL ENCOUNTER (OUTPATIENT)
Age: 67
Setting detail: OUTPATIENT SURGERY
Discharge: NURSING, REHAB OR LONG-TERM CARE | End: 2022-05-09
Attending: SURGERY | Admitting: SURGERY
Payer: MEDICARE

## 2022-05-09 ENCOUNTER — HOSPITAL ENCOUNTER (OUTPATIENT)
Dept: ULTRASOUND IMAGING | Age: 67
Discharge: HOME | End: 2022-05-11
Payer: MEDICARE

## 2022-05-09 ENCOUNTER — ANESTHESIA (OUTPATIENT)
Dept: OPERATING ROOM | Age: 67
End: 2022-05-09
Payer: MEDICARE

## 2022-05-09 VITALS
TEMPERATURE: 96.8 F | SYSTOLIC BLOOD PRESSURE: 119 MMHG | DIASTOLIC BLOOD PRESSURE: 88 MMHG | RESPIRATION RATE: 14 BRPM | WEIGHT: 217 LBS | HEIGHT: 62 IN | OXYGEN SATURATION: 96 % | HEART RATE: 61 BPM | BODY MASS INDEX: 39.93 KG/M2

## 2022-05-09 VITALS — TEMPERATURE: 96.4 F | OXYGEN SATURATION: 94 % | DIASTOLIC BLOOD PRESSURE: 53 MMHG | SYSTOLIC BLOOD PRESSURE: 88 MMHG

## 2022-05-09 DIAGNOSIS — C50.911 DUCTAL CARCINOMA OF RIGHT BREAST (HCC): ICD-10-CM

## 2022-05-09 DIAGNOSIS — G89.18 POST-OP PAIN: Primary | ICD-10-CM

## 2022-05-09 DIAGNOSIS — N63.0 BREAST MASS: ICD-10-CM

## 2022-05-09 DIAGNOSIS — Z98.890 STATUS POST BREAST LUMPECTOMY: ICD-10-CM

## 2022-05-09 DIAGNOSIS — Z98.890 STATUS POST BREAST BIOPSY: ICD-10-CM

## 2022-05-09 PROCEDURE — 3430000000 HC RX DIAGNOSTIC RADIOPHARMACEUTICAL: Performed by: INTERNAL MEDICINE

## 2022-05-09 PROCEDURE — 2500000003 HC RX 250 WO HCPCS: Performed by: SURGERY

## 2022-05-09 PROCEDURE — 7100000011 HC PHASE II RECOVERY - ADDTL 15 MIN: Performed by: SURGERY

## 2022-05-09 PROCEDURE — 7100000001 HC PACU RECOVERY - ADDTL 15 MIN: Performed by: SURGERY

## 2022-05-09 PROCEDURE — 7100000000 HC PACU RECOVERY - FIRST 15 MIN: Performed by: SURGERY

## 2022-05-09 PROCEDURE — 2580000003 HC RX 258: Performed by: ANESTHESIOLOGY

## 2022-05-09 PROCEDURE — 77065 DX MAMMO INCL CAD UNI: CPT

## 2022-05-09 PROCEDURE — 88307 TISSUE EXAM BY PATHOLOGIST: CPT

## 2022-05-09 PROCEDURE — 3430000000 HC RX DIAGNOSTIC RADIOPHARMACEUTICAL

## 2022-05-09 PROCEDURE — A9520 TC99 TILMANOCEPT DIAG 0.5MCI: HCPCS | Performed by: INTERNAL MEDICINE

## 2022-05-09 PROCEDURE — 78195 LYMPH SYSTEM IMAGING: CPT

## 2022-05-09 PROCEDURE — 2580000003 HC RX 258: Performed by: NURSE ANESTHETIST, CERTIFIED REGISTERED

## 2022-05-09 PROCEDURE — 2500000003 HC RX 250 WO HCPCS

## 2022-05-09 PROCEDURE — 7100000010 HC PHASE II RECOVERY - FIRST 15 MIN: Performed by: SURGERY

## 2022-05-09 PROCEDURE — 19285 PERQ DEV BREAST 1ST US IMAG: CPT

## 2022-05-09 PROCEDURE — 3700000000 HC ANESTHESIA ATTENDED CARE: Performed by: SURGERY

## 2022-05-09 PROCEDURE — A9520 TC99 TILMANOCEPT DIAG 0.5MCI: HCPCS

## 2022-05-09 PROCEDURE — 6360000002 HC RX W HCPCS: Performed by: NURSE ANESTHETIST, CERTIFIED REGISTERED

## 2022-05-09 PROCEDURE — 88342 IMHCHEM/IMCYTCHM 1ST ANTB: CPT

## 2022-05-09 PROCEDURE — 3600000012 HC SURGERY LEVEL 2 ADDTL 15MIN: Performed by: SURGERY

## 2022-05-09 PROCEDURE — 3600000002 HC SURGERY LEVEL 2 BASE: Performed by: SURGERY

## 2022-05-09 PROCEDURE — 6370000000 HC RX 637 (ALT 250 FOR IP): Performed by: INTERNAL MEDICINE

## 2022-05-09 PROCEDURE — 76098 X-RAY EXAM SURGICAL SPECIMEN: CPT

## 2022-05-09 PROCEDURE — 3700000001 HC ADD 15 MINUTES (ANESTHESIA): Performed by: SURGERY

## 2022-05-09 PROCEDURE — 2709999900 HC NON-CHARGEABLE SUPPLY: Performed by: SURGERY

## 2022-05-09 PROCEDURE — 2500000003 HC RX 250 WO HCPCS: Performed by: NURSE ANESTHETIST, CERTIFIED REGISTERED

## 2022-05-09 RX ORDER — ONDANSETRON 2 MG/ML
4 INJECTION INTRAMUSCULAR; INTRAVENOUS
Status: DISCONTINUED | OUTPATIENT
Start: 2022-05-09 | End: 2022-05-09 | Stop reason: HOSPADM

## 2022-05-09 RX ORDER — FENTANYL CITRATE 50 UG/ML
25 INJECTION, SOLUTION INTRAMUSCULAR; INTRAVENOUS EVERY 5 MIN PRN
Status: DISCONTINUED | OUTPATIENT
Start: 2022-05-09 | End: 2022-05-09 | Stop reason: HOSPADM

## 2022-05-09 RX ORDER — LIDOCAINE 40 MG/G
CREAM TOPICAL
Status: CANCELLED | OUTPATIENT
Start: 2022-05-09

## 2022-05-09 RX ORDER — HYDROMORPHONE HYDROCHLORIDE 1 MG/ML
0.5 INJECTION, SOLUTION INTRAMUSCULAR; INTRAVENOUS; SUBCUTANEOUS EVERY 5 MIN PRN
Status: DISCONTINUED | OUTPATIENT
Start: 2022-05-09 | End: 2022-05-09 | Stop reason: HOSPADM

## 2022-05-09 RX ORDER — SODIUM CHLORIDE, SODIUM LACTATE, POTASSIUM CHLORIDE, CALCIUM CHLORIDE 600; 310; 30; 20 MG/100ML; MG/100ML; MG/100ML; MG/100ML
INJECTION, SOLUTION INTRAVENOUS CONTINUOUS PRN
Status: DISCONTINUED | OUTPATIENT
Start: 2022-05-09 | End: 2022-05-09 | Stop reason: SDUPTHER

## 2022-05-09 RX ORDER — LIDOCAINE 40 MG/G
CREAM TOPICAL
Status: COMPLETED | OUTPATIENT
Start: 2022-05-09 | End: 2022-05-09

## 2022-05-09 RX ORDER — SODIUM CHLORIDE 0.9 % (FLUSH) 0.9 %
5-40 SYRINGE (ML) INJECTION PRN
Status: DISCONTINUED | OUTPATIENT
Start: 2022-05-09 | End: 2022-05-09 | Stop reason: HOSPADM

## 2022-05-09 RX ORDER — SODIUM CHLORIDE 9 MG/ML
INJECTION, SOLUTION INTRAVENOUS PRN
Status: DISCONTINUED | OUTPATIENT
Start: 2022-05-09 | End: 2022-05-09 | Stop reason: HOSPADM

## 2022-05-09 RX ORDER — SODIUM CHLORIDE 0.9 % (FLUSH) 0.9 %
5-40 SYRINGE (ML) INJECTION EVERY 12 HOURS SCHEDULED
Status: DISCONTINUED | OUTPATIENT
Start: 2022-05-09 | End: 2022-05-09 | Stop reason: HOSPADM

## 2022-05-09 RX ORDER — ONDANSETRON 2 MG/ML
INJECTION INTRAMUSCULAR; INTRAVENOUS PRN
Status: DISCONTINUED | OUTPATIENT
Start: 2022-05-09 | End: 2022-05-09 | Stop reason: SDUPTHER

## 2022-05-09 RX ORDER — CLINDAMYCIN PHOSPHATE 900 MG/50ML
900 INJECTION INTRAVENOUS ONCE
Status: COMPLETED | OUTPATIENT
Start: 2022-05-09 | End: 2022-05-09

## 2022-05-09 RX ORDER — FAMOTIDINE 40 MG/1
40 TABLET, FILM COATED ORAL DAILY
COMMUNITY

## 2022-05-09 RX ORDER — ROCURONIUM BROMIDE 10 MG/ML
INJECTION, SOLUTION INTRAVENOUS PRN
Status: DISCONTINUED | OUTPATIENT
Start: 2022-05-09 | End: 2022-05-09 | Stop reason: SDUPTHER

## 2022-05-09 RX ORDER — KETOROLAC TROMETHAMINE 30 MG/ML
INJECTION, SOLUTION INTRAMUSCULAR; INTRAVENOUS PRN
Status: DISCONTINUED | OUTPATIENT
Start: 2022-05-09 | End: 2022-05-09 | Stop reason: SDUPTHER

## 2022-05-09 RX ORDER — FENTANYL CITRATE 50 UG/ML
INJECTION, SOLUTION INTRAMUSCULAR; INTRAVENOUS PRN
Status: DISCONTINUED | OUTPATIENT
Start: 2022-05-09 | End: 2022-05-09 | Stop reason: SDUPTHER

## 2022-05-09 RX ORDER — DEXAMETHASONE SODIUM PHOSPHATE 10 MG/ML
INJECTION, SOLUTION INTRAMUSCULAR; INTRAVENOUS PRN
Status: DISCONTINUED | OUTPATIENT
Start: 2022-05-09 | End: 2022-05-09 | Stop reason: SDUPTHER

## 2022-05-09 RX ORDER — LIDOCAINE HYDROCHLORIDE 10 MG/ML
1 INJECTION, SOLUTION EPIDURAL; INFILTRATION; INTRACAUDAL; PERINEURAL
Status: DISCONTINUED | OUTPATIENT
Start: 2022-05-10 | End: 2022-05-09 | Stop reason: HOSPADM

## 2022-05-09 RX ORDER — PROPOFOL 10 MG/ML
INJECTION, EMULSION INTRAVENOUS PRN
Status: DISCONTINUED | OUTPATIENT
Start: 2022-05-09 | End: 2022-05-09 | Stop reason: SDUPTHER

## 2022-05-09 RX ORDER — HYDROCODONE BITARTRATE AND ACETAMINOPHEN 5; 325 MG/1; MG/1
1 TABLET ORAL EVERY 6 HOURS PRN
Qty: 20 TABLET | Refills: 0 | Status: SHIPPED | OUTPATIENT
Start: 2022-05-09 | End: 2022-05-14

## 2022-05-09 RX ORDER — SODIUM CHLORIDE, SODIUM LACTATE, POTASSIUM CHLORIDE, CALCIUM CHLORIDE 600; 310; 30; 20 MG/100ML; MG/100ML; MG/100ML; MG/100ML
INJECTION, SOLUTION INTRAVENOUS CONTINUOUS
Status: DISCONTINUED | OUTPATIENT
Start: 2022-05-10 | End: 2022-05-09 | Stop reason: HOSPADM

## 2022-05-09 RX ORDER — LIDOCAINE HYDROCHLORIDE 20 MG/ML
INJECTION, SOLUTION EPIDURAL; INFILTRATION; INTRACAUDAL; PERINEURAL PRN
Status: DISCONTINUED | OUTPATIENT
Start: 2022-05-09 | End: 2022-05-09 | Stop reason: SDUPTHER

## 2022-05-09 RX ADMIN — DEXAMETHASONE SODIUM PHOSPHATE 10 MG: 10 INJECTION, SOLUTION INTRAMUSCULAR; INTRAVENOUS at 13:18

## 2022-05-09 RX ADMIN — SODIUM CHLORIDE, POTASSIUM CHLORIDE, SODIUM LACTATE AND CALCIUM CHLORIDE: 600; 310; 30; 20 INJECTION, SOLUTION INTRAVENOUS at 13:06

## 2022-05-09 RX ADMIN — SUGAMMADEX 200 MG: 100 INJECTION, SOLUTION INTRAVENOUS at 13:47

## 2022-05-09 RX ADMIN — PROPOFOL 160 MG: 10 INJECTION, EMULSION INTRAVENOUS at 13:12

## 2022-05-09 RX ADMIN — KETOROLAC TROMETHAMINE 15 MG: 30 INJECTION, SOLUTION INTRAMUSCULAR; INTRAVENOUS at 13:45

## 2022-05-09 RX ADMIN — LIDOCAINE HYDROCHLORIDE 80 MG: 20 INJECTION, SOLUTION EPIDURAL; INFILTRATION; INTRACAUDAL at 13:12

## 2022-05-09 RX ADMIN — Medication 100 MCG: at 13:12

## 2022-05-09 RX ADMIN — CLINDAMYCIN PHOSPHATE 900 MG: 900 INJECTION, SOLUTION INTRAVENOUS at 13:16

## 2022-05-09 RX ADMIN — ONDANSETRON 4 MG: 2 INJECTION INTRAMUSCULAR; INTRAVENOUS at 13:18

## 2022-05-09 RX ADMIN — LIDOCAINE 4%: 4 CREAM TOPICAL at 10:26

## 2022-05-09 RX ADMIN — TILMANOCEPT 0.6 MILLICURIE: KIT at 11:55

## 2022-05-09 RX ADMIN — SODIUM CHLORIDE, POTASSIUM CHLORIDE, SODIUM LACTATE AND CALCIUM CHLORIDE: 600; 310; 30; 20 INJECTION, SOLUTION INTRAVENOUS at 10:26

## 2022-05-09 RX ADMIN — ROCURONIUM BROMIDE 40 MG: 10 INJECTION, SOLUTION INTRAVENOUS at 13:12

## 2022-05-09 ASSESSMENT — PULMONARY FUNCTION TESTS
PIF_VALUE: 1
PIF_VALUE: 17
PIF_VALUE: 1
PIF_VALUE: 20
PIF_VALUE: 9
PIF_VALUE: 21
PIF_VALUE: 22
PIF_VALUE: 21
PIF_VALUE: 24
PIF_VALUE: 20
PIF_VALUE: 20
PIF_VALUE: 17
PIF_VALUE: 20
PIF_VALUE: 21
PIF_VALUE: 1
PIF_VALUE: 17
PIF_VALUE: 20
PIF_VALUE: 20
PIF_VALUE: 17
PIF_VALUE: 2
PIF_VALUE: 21
PIF_VALUE: 20
PIF_VALUE: 20
PIF_VALUE: 1
PIF_VALUE: 14
PIF_VALUE: 17
PIF_VALUE: 67
PIF_VALUE: 29
PIF_VALUE: 59
PIF_VALUE: 20
PIF_VALUE: 18
PIF_VALUE: 20
PIF_VALUE: 10
PIF_VALUE: 20
PIF_VALUE: 3
PIF_VALUE: 20
PIF_VALUE: 21
PIF_VALUE: 18
PIF_VALUE: 21
PIF_VALUE: 2
PIF_VALUE: 2
PIF_VALUE: 20
PIF_VALUE: 22
PIF_VALUE: 20
PIF_VALUE: 1
PIF_VALUE: 25
PIF_VALUE: 20
PIF_VALUE: 18
PIF_VALUE: 4
PIF_VALUE: 20
PIF_VALUE: 17
PIF_VALUE: 20
PIF_VALUE: 21
PIF_VALUE: 21
PIF_VALUE: 20
PIF_VALUE: 20
PIF_VALUE: 21
PIF_VALUE: 20
PIF_VALUE: 20
PIF_VALUE: 18
PIF_VALUE: 17

## 2022-05-09 ASSESSMENT — PAIN SCALES - GENERAL
PAINLEVEL_OUTOF10: 0

## 2022-05-09 ASSESSMENT — PAIN - FUNCTIONAL ASSESSMENT: PAIN_FUNCTIONAL_ASSESSMENT: 0-10

## 2022-05-09 NOTE — ANESTHESIA POSTPROCEDURE EVALUATION
Department of Anesthesiology  Postprocedure Note    Patient: Seth Candelaria  MRN: [id number]  YOB: 1955  Date of evaluation: 5/9/2022  Time:  3:23 PM     Procedure Summary     Date: 05/09/22 Room / Location: [address] [place] [address] 12    Anesthesia Start: 3300 Anesthesia Stop: 6880    Procedure: RIGHT BREAST NEEDLE LOCALIZED (@    11   )LUMPECTOMY   WITH  RIGHT SENTINEL NODE BIOPSY( @   12     )   DISSECTION (Right ) Diagnosis: (DX RIGHT BREAST CA  (TC))    Surgeons: Gillian Avendaño MD Responsible Provider: Jayme Tobias DO    Anesthesia Type: general ASA Status: 3          Anesthesia Type: general    Arturo Phase I: Arturo Score: 8    Arturo Phase II: Arturo Score: 9    Last vitals: Reviewed and per EMR flowsheets.        Anesthesia Post Evaluation    Patient location during evaluation: PACU  Patient participation: complete - patient participated  Level of consciousness: awake and alert  Airway patency: patent  Nausea & Vomiting: no nausea and no vomiting  Complications: no  Cardiovascular status: hemodynamically stable  Respiratory status: acceptable  Hydration status: stable

## 2022-05-09 NOTE — OP NOTE
Operative Note      Patient: Annel Christian  YOB: 1955  MRN: [id number]    Date of Procedure: 5/9/2022    Pre-Op Diagnosis: DX RIGHT BREAST CA  (TC)    Post-Op Diagnosis: Same       Procedure:  Right breast needle localized lumpectomy  Right axillary sentinel lymph node biopsy    Surgeon(s):  Umesh Fuller MD    Assistant:   Resident: Irma Jay MD    Anesthesia: General    Estimated Blood Loss (mL): less than 50     Complications: None    Specimens:   ID Type Source Tests Collected by Time Destination   A : RIGHT BREAST LUMPECTOMY SHORT SUTURE SUPERIOR, LONG SUTURE IS LATERAL Tissue Breast SURGICAL PATHOLOGY Umesh Fuller MD 5/9/2022 1339    B : RIGHT BREAST SENTINEL NODE BX Tissue Breast SURGICAL PATHOLOGY Umesh Fuller MD 5/9/2022 1344        Implants:  * No implants in log *      Drains: * No LDAs found *    Findings: Localized lumpectomy with wire, clip, and mass within the specimen submitted, sentinel lymph node sent for permanent pathologic evaluation    Indications for procedure: This is a very pleasant 56-year-old female with a recently diagnosed right breast cancer. The risks and benefits of right breast lumpectomy with sentinel lymph node biopsy were discussed with the patient and verbal and written consent was obtained. Detailed Description of Procedure:   After verbal and written consent, the patient was brought to the operating room. After appropriate monitoring, general anesthesia was administered. A timeout was performed with the staff in the room confirming both the patient and the procedure to be performed. The procedure was begun with a sterile prep and drape. An elliptical incision was made around the localizing wire. Dissection was carried down to the subcutaneous tissue with cautery. A generous circumferential margin around the localizing wire was then performed. This included a portion of the pectoralis facet as a deep margin.   The specimen was oriented once excised with a short stitch superior long stitch lateral.  The specimen was sent for radiograph confirming the clip, wire, and mass within the specimen and question. Through the same incision, the axillary fascia was opened. A neoprobe was used to identify the right axillary sentinel lymph node. This was excised and sent for permanent pathologic evaluation. Clips were then placed in the lumpectomy cavity to omayra the margins of our lumpectomy site once hemostasis was obtained with cautery, the incision was closed in layers with interrupted Vicryl suture used to close deep dermal tissue and running Monocryl sutures to close skin. Dermabond was applied to the wound. A sterile dressing was applied. The sponge, lap, needle, and instrument count were correct at the end of the case. The patient was awakened from anesthesia and transported to the recurrence of vision. There were no immediate complications.     Electronically signed by Ronna Gregorio MD on 5/9/2022 at 2:35 PM

## 2022-05-09 NOTE — H&P
History and Physical Service   [facility]    HISTORY AND PHYSICAL EXAMINATION            Date of Evaluation: 2022  Patient name:  Ayaan Coronado  MRN:   [id number]  YOB: 1955  PCP:    Josse Gamboa DO    History Obtained From:     Patient, Pt's sister, Medical records    History of Present Illness: This is Ayaan Coronado a 75 y.o. female who presents today for a RIGHT BREAST NEEDLE LOCALIZED (@ 11) LUMPECTOMY WITH  RIGHT SENTINEL NODE BIOPSY (@ 12) POSSIBLE  AXILLARY LYMPH NODE DISSECTION by Dr. Tori Avalos MD due to RIGHT BREAST CA. Pt states she had a screening mammogram earlier this year which revealed a right breast abnormality. She had a right breast biopsy where revealed invasive ductal carcinoma per Dr. Floridalma Loaiza note dated 2/10/22 in the pt's paper chart. Dr. Jefferson Gowers recommended surgical intervention per his note. Pt denies breast pain, breast edema, nipple discharge, fevers, chills, chest pain, dyspnea, rashes, open sores, wounds, and history of diabetes. Pt is a poor historian. Her sister is at bedside and helped answer questions. Motrin was last taken more than 1 week ago. Calcium 600+D3, vitamin D, and multiple vitamin was last taken in the past week. Plavix was last taken on 22. Gynecological History:  Menarche at 11years old. Oral contraceptive use for a total of 6 months. . Menopause at approximately 60years old. History of CAD, hyperlipidemia, hypertension, stroke in 2017, asthma, COPD, Parkinson's disease, and sleep apnea. Pt has short term memory loss due to stroke. Pt denies residual weakness from the stroke. Pt resides at [facility] in [place], [place]. S/p cardiac catheterizations without stent placement. Last cardiac catheterization was several years ago. Pt has followed-up in the past with Dr. Cortney Valadez from pulmonology, Dr. Brandi Veloz from cardiology, and Dr. Teresa Chin from neurology.  Medical clearance dated 3/11/22 from Dr. Sue Alonso is in the pt's paper chart. Narrative   EXAMINATION:   TWO XRAY VIEWS OF THE CHEST       3/23/2022 10:36 am       COMPARISON:   09/12/2021       HISTORY:   ORDERING SYSTEM PROVIDED HISTORY: COPD with productive cough, upcoming surgery   TECHNOLOGIST PROVIDED HISTORY:   COPD with productive cough, upcoming surgery   Reason for Exam: pre op lumpectomy       FINDINGS:   As compared to prior examination, there is no change.  There is a calcified   granuloma in the the right lung base.  There are decreased markings noted in   the upper lung zones suggestive of emphysema.  The heart and mediastinal   structures are unremarkable.  Bony structures appear normal.                 Impression   Decreased markings noted in the upper lung zones suggestive of emphysema.  No   change from prior examination.  Calcified granuloma in the right lung base.            Cardiac stress test 9/12/2021:  1. No definitive scintigraphic evidence for reversible ischemia or infarct. 2. Left ventricular ejection fracture of greater than 70%. 3.  Please see report for EKG portion of the examination which will be   performed separately by physician from cardiology.    Risk stratification:  Low risk        Past Medical History:     Past Medical History:   Diagnosis Date    Arthritis     Asthma     Bursitis     unspecified, hip    CAD (coronary artery disease)     Cancer ([address]     right breast    Cerebral artery occlusion with cerebral infarction ([address]     COPD (chronic obstructive pulmonary disease) (HCC)     Depression     Dyskinesia, drug-induced     Gastric reflux     Hyperlipidemia     Hypertension     Parkinson disease ([address]     Restless leg     Sleep apnea     Sleep apnea     Urinary incontinence         Past Surgical History:     Past Surgical History:   Procedure Laterality Date    ADENOIDECTOMY      CARDIAC CATHETERIZATION      CORONARY ARTERY BYPASS GRAFT      FOOT SURGERY      TONSILLECTOMY      WRIST SURGERY carpel tunnel surgery bilateral        Medications Prior to Admission:     Prior to Admission medications    Medication Sig Start Date End Date Taking?  Authorizing Provider   buPROPion [facility] SR) 150 MG extended release tablet Take 150 mg by mouth in the morning and at bedtime    Historical Provider, MD   calcium carb-cholecalciferol (CALCIUM 600+D3) 600-200 MG-UNIT TABS tablet Take 1 tablet by mouth daily    Historical Provider, MD   Valbenazine Tosylate (INGREZZA) 40 MG CAPS Take 40 mg by mouth daily    Historical Provider, MD   Multiple Vitamin (DAILY NIKKI) TABS Take 1 tablet by mouth daily    Historical Provider, MD   fluticasone (FLONASE) 50 MCG/ACT nasal spray 1 spray by Each Nostril route daily    Historical Provider, MD   budesonide-formoterol (SYMBICORT) 160-4.5 MCG/ACT AERO Inhale 2 puffs into the lungs 2 times daily    Historical Provider, MD   acetaminophen (TYLENOL) 325 MG tablet Take 650 mg by mouth every 4-6 hours as needed for Pain    Historical Provider, MD   cetirizine (ZYRTEC) 10 MG tablet Take 10 mg by mouth daily as needed for Allergies     Historical Provider, MD   ibuprofen (ADVIL;MOTRIN) 400 MG tablet Take 400 mg by mouth every 6 hours as needed for Pain    Historical Provider, MD   Cholecalciferol (VITAMIN D3) 25 MCG (1000 UT) CAPS Take 1,000 Units by mouth daily    Historical Provider, MD   atorvastatin (LIPITOR) 40 MG tablet Take 1 tablet by mouth nightly 9/13/21   Guy Smith MD   carbidopa-levodopa (SINEMET)  MG per tablet Take 1 tablet by mouth 4 times daily  8/26/21   Historical Provider, MD   pantoprazole (PROTONIX) 40 MG tablet TAKE ONE TABLET BY MOUTH ONCE DAILY FOR  90  DAYS    Historical Provider, MD   oxybutynin (DITROPAN-XL) 10 MG extended release tablet TAKE ONE TABLET BY MOUTH ONCE DAILY    Historical Provider, MD   losartan (COZAAR) 100 MG tablet TAKE ONE TABLET BY MOUTH ONCE DAILY    Historical Provider, MD   hydrALAZINE (APRESOLINE) 10 MG tablet Take 10 mg by mouth 3 times daily     Historical Provider, MD   DULoxetine (CYMBALTA) 60 MG extended release capsule Take 60 mg by mouth daily     Historical Provider, MD   clopidogrel (PLAVIX) 75 MG tablet TAKE ONE TABLET BY MOUTH ONCE DAILY    Historical Provider, MD   carvedilol (COREG) 12.5 MG tablet TAKE ONE TABLET BY MOUTH TWICE A DAY WITH MORNING AND EVENING MEAL. Historical Provider, MD   albuterol sulfate HFA (PROAIR HFA) 108 (90 BASE) MCG/ACT inhaler Inhale 2 puffs every 4 hours by inhalation route for 30 days. Historical Provider, MD   amLODIPine (NORVASC) 5 MG tablet TAKE ONE TABLET BY MOUTH ONCE DAILY    Historical Provider, MD   albuterol (PROVENTIL) (2.5 MG/3ML) 0.083% nebulizer solution 3 mLs    Historical Provider, MD        Allergies:     Codeine, Lisinopril, Phenobarbital, Tetanus-diphtheria toxoids td, Erythromycin base, Nickel, Penicillins, and Tetracyclines & related    Social History:     Tobacco:    reports that she has never smoked. She has never used smokeless tobacco.  Alcohol:      reports no history of alcohol use. Drug Use:  reports no history of drug use. Family History:     Family History   Problem Relation Age of Onset    Stroke Mother     Heart Attack Father        Review of Systems:     Positive and Negative as described in HPI. CONSTITUTIONAL: Fatigue. Unintentional weight loss of 50-75 pounds in the past year. Negative for fevers, chills, and sweats. HEENT: Pt wears glasses. Hearing loss. Seasonal sinus issues. Negative for rhinorrhea and throat pain. RESPIRATORY: COPD. Pt uses her albuterol inhaler once per month. Bronchitis in 3/2022-treated with steroids and antibiotics. Sleep apnea. Dry cough. Negative for shortness of breath, congestion, and wheezing. CARDIOVASCULAR: CAD. Hypertension. Hyperlipidemia. Negative for chest pain, blood clot, irregular heartbeat, and palpitations.   GASTROINTESTINAL: Negative for reflux, nausea, vomiting, diarrhea, constipation, change in bowel habits, and abdominal pain. Pt has a good appetite. GENITOURINARY: Negative for difficulty of urination, burning with urination, and frequency. INTEGUMENT: Negative for rash, easy bruising, and skin lesions. HEMATOLOGIC/LYMPHATIC: Negative for swelling/edema. ALLERGIC/IMMUNOLOGIC: Negative for urticaria and itching. ENDOCRINE: Negative for increase in drinking, increase in urination, and heat or cold intolerance. MUSCULOSKELETAL: Negative joint pains, muscle aches, and swelling of joints. NEUROLOGICAL: Parkinson's disease. Occasional stumbling. Stroke in 2017 with short term memory loss. Pt denies residual weakness from the stroke. Tardive dyskinesia. Negative for headaches, dizziness, lightheadedness, numbness, and tingling extremities. BEHAVIOR/PSYCH: Treated depression. Anxiety. Physical Exam:   /75   Pulse 78   Temp 97.5 °F (36.4 °C) (Temporal)   Resp 18   Ht 5' 2\" (1.575 m)   Wt 217 lb (98.4 kg)   SpO2 96%   BMI 39.69 kg/m²   No LMP recorded. Patient is postmenopausal.  No obstetric history on file. No results for input(s): POCGLU in the last 72 hours. General Appearance:  Alert, well appearing, and in no acute distress. Poor historian. Mental status: Oriented to person, place, and time. Head: Normocephalic and atraumatic. Eye: No icterus, redness, pupils equal and reactive, extraocular eye movements intact, and conjunctiva clear. Ear: Hard of hearing. Nose: No drainage noted. Mouth: Mucous membranes moist.  Neck: Supple and no carotid bruits noted. Lungs: Bilateral equal air entry, clear to auscultation, no wheezing, rales or rhonchi, and normal effort. Cardiovascular: Normal rate, regular rhythm, no murmur, gallop, and rub. Abdomen: Soft, nontender, nondistended, and active bowel sounds. Neurologic: Tardive dyskinesia. Normal speech and cranial nerves II through XII grossly intact. Bilateral plantar flexion 5/5. Bilateral dorsiflexion 5/5.    Skin: No gross lesions, rashes, bruising, or bleeding on exposed skin area. Extremities: Posterior tibial pulses 2+ bilaterally. No ankle edema. No calf tenderness with palpation. Psych: Normal affect. Investigations:      Laboratory Testing:  No results found for this or any previous visit (from the past 24 hour(s)). No results for input(s): HGB, HCT, WBC, MCV, PLATELET, NA, K, CL, CO2, BUN, CREATININE, GLUCOSE, INR, PROTIME, APTT, AST, ALT, LABALBU, HCG in the last 720 hours. No results for input(s): COVID19 in the last 720 hours. Diagnosis:      1. RIGHT BREAST CA       Plans:     1.  RIGHT BREAST NEEDLE LOCALIZED (@    11   )LUMPECTOMY   WITH  RIGHT SENTINEL NODE BIOPSY( @   12     )  POSSIBLE  AXILLARY LYMPH NODE DISSECTION       MENG Castellon CNP  5/9/2022  10:06 AM

## 2022-05-09 NOTE — ANESTHESIA PRE PROCEDURE
Department of Anesthesiology  Preprocedure Note       Name:  Edie Walker   Age:  75 y.o.  :  1955                                          MRN:  [id number]         Date:  2022      Surgeon: Rakesh Peñaloza):  Chanda Banuelos MD    Procedure: Procedure(s):  RIGHT BREAST NEEDLE LOCALIZED (@    11   )LUMPECTOMY   WITH  RIGHT SENTINEL NODE BIOPSY( @   12     )  POSSIBLE  AXILLARY LYMPH NODE DISSECTION    Medications prior to admission:   Prior to Admission medications    Medication Sig Start Date End Date Taking? Authorizing Provider   famotidine (PEPCID) 40 MG tablet Take 40 mg by mouth daily   Yes Historical Provider, MD   HYDROcodone-acetaminophen (NORCO) 5-325 MG per tablet Take 1 tablet by mouth every 6 hours as needed for Pain for up to 5 days. Intended supply: 5 days.  Take lowest dose possible to manage pain 22 Yes Ivana Ho MD   buPROPion [facility]) 150 MG extended release tablet Take 150 mg by mouth in the morning and at bedtime    Historical Provider, MD   calcium carb-cholecalciferol (CALCIUM 600+D3) 600-200 MG-UNIT TABS tablet Take 1 tablet by mouth daily    Historical Provider, MD   Valbenazine Tosylate (INGREZZA) 40 MG CAPS Take 40 mg by mouth daily    Historical Provider, MD   Multiple Vitamin (DAILY NIKKI) TABS Take 1 tablet by mouth daily    Historical Provider, MD   fluticasone (FLONASE) 50 MCG/ACT nasal spray 1 spray by Each Nostril route daily    Historical Provider, MD   budesonide-formoterol (SYMBICORT) 160-4.5 MCG/ACT AERO Inhale 2 puffs into the lungs 2 times daily    Historical Provider, MD   acetaminophen (TYLENOL) 325 MG tablet Take 650 mg by mouth every 4-6 hours as needed for Pain    Historical Provider, MD   cetirizine (ZYRTEC) 10 MG tablet Take 10 mg by mouth daily as needed for Allergies     Historical Provider, MD   ibuprofen (ADVIL;MOTRIN) 400 MG tablet Take 400 mg by mouth every 6 hours as needed for Pain    Historical Provider, MD   Cholecalciferol (VITAMIN D3) 25 MCG (1000 UT) CAPS Take 1,000 Units by mouth daily    Historical Provider, MD   atorvastatin (LIPITOR) 40 MG tablet Take 1 tablet by mouth nightly 9/13/21   Nataly Estes MD   carbidopa-levodopa (SINEMET)  MG per tablet Take 1 tablet by mouth 4 times daily  8/26/21   Historical Provider, MD   pantoprazole (PROTONIX) 40 MG tablet TAKE ONE TABLET BY MOUTH ONCE DAILY FOR  90  DAYS    Historical Provider, MD   oxybutynin (DITROPAN-XL) 10 MG extended release tablet TAKE ONE TABLET BY MOUTH ONCE DAILY    Historical Provider, MD   losartan (COZAAR) 100 MG tablet TAKE ONE TABLET BY MOUTH ONCE DAILY    Historical Provider, MD   hydrALAZINE (APRESOLINE) 10 MG tablet Take 10 mg by mouth 3 times daily     Historical Provider, MD   DULoxetine (CYMBALTA) 60 MG extended release capsule Take 60 mg by mouth daily     Historical Provider, MD   clopidogrel (PLAVIX) 75 MG tablet TAKE ONE TABLET BY MOUTH ONCE DAILY    Historical Provider, MD   carvedilol (COREG) 12.5 MG tablet TAKE ONE TABLET BY MOUTH TWICE A DAY WITH MORNING AND EVENING MEAL. Historical Provider, MD   albuterol sulfate HFA (PROAIR HFA) 108 (90 BASE) MCG/ACT inhaler Inhale 2 puffs every 4 hours by inhalation route for 30 days. Historical Provider, MD   amLODIPine (NORVASC) 5 MG tablet TAKE ONE TABLET BY MOUTH ONCE DAILY    Historical Provider, MD   albuterol (PROVENTIL) (2.5 MG/3ML) 0.083% nebulizer solution 3 mLs    Historical Provider, MD       Current medications:    No current facility-administered medications for this encounter. Facility-Administered Medications Ordered in Other Encounters   Medication Dose Route Frequency Provider Last Rate Last Admin    technetium Tc 99m tilmanocept (LYMPHOSEEK) injection 0.6 millicurie  076 micro curie SubCUTAneous ONCE PRN [facility]   0.6 millicurie at 56/87/72 8037       Allergies:     Allergies   Allergen Reactions    Codeine Other (See Comments)     Unknown reaction    Lisinopril Other (See Comments)     cough    Phenobarbital Other (See Comments)     \"passed out\"    Tetanus-Diphtheria Toxoids Td Swelling    Erythromycin Base Rash    Nickel Rash     Contact metal    Penicillins Rash    Tetracyclines & Related Rash       Problem List:    Patient Active Problem List   Diagnosis Code    Arthritis of left hip M16.12    Encounter for pain management planning Z01.89    Parkinson's disease (tremor, stiffness, slow motion, unstable posture) ([address] G20    Primary osteoarthritis of left hip M16.12    Chest pain R07.9    Essential hypertension I10    Dyslipidemia E78.5    Morbid obesity with BMI of 40.0-44.9, adult ([facility]) E66.01, Z68.41    ANA on CPAP G47.33, Z99.89       Past Medical History:        Diagnosis Date    Arthritis     Asthma     Bursitis     unspecified, hip    CAD (coronary artery disease)     Cancer ([facility])     right breast    Cerebral artery occlusion with cerebral infarction ([facility]     COPD (chronic obstructive pulmonary disease) ([facility])     Depression     Dyskinesia, drug-induced     Gastric reflux     Hyperlipidemia     Hypertension     Parkinson disease ([facility]     Restless leg     Sleep apnea     Sleep apnea     Urinary incontinence        Past Surgical History:        Procedure Laterality Date    ADENOIDECTOMY      CARDIAC CATHETERIZATION      CORONARY ARTERY BYPASS GRAFT      FOOT SURGERY      TONSILLECTOMY      US GUIDED NEEDLE LOC OF RIGHT BREAST Right 5/9/2022    US GUIDED NEEDLE LOC OF RIGHT BREAST 5/9/2022 [facility] ULTRASOUND    WRIST SURGERY      carpel tunnel surgery bilateral       Social History:    Social History     Tobacco Use    Smoking status: Never Smoker    Smokeless tobacco: Never Used   Substance Use Topics    Alcohol use:  No                                Counseling given: Not Answered      Vital Signs (Current):   Vitals:    05/09/22 0958 05/09/22 1414   BP: 138/75 137/80   Pulse: 78 62   Resp: 18 16   Temp: 97.5 °F (36.4 °C) 97.5 °F (36.4 °C)   TempSrc: Temporal Temporal   SpO2: 96% 97%   Weight: 217 lb (98.4 kg)    Height: 5' 2\" (1.575 m)                                               BP Readings from Last 3 Encounters:   05/09/22 137/80   05/09/22 (!) 88/53   03/23/22 (!) 141/64       NPO Status: Time of last liquid consumption: 1800                        Time of last solid consumption: 1800                        Date of last liquid consumption: 05/08/22                        Date of last solid food consumption: 05/08/22    BMI:   Wt Readings from Last 3 Encounters:   05/09/22 217 lb (98.4 kg)   03/23/22 217 lb (98.4 kg)   09/12/21 220 lb (99.8 kg)     Body mass index is 39.69 kg/m². CBC:   Lab Results   Component Value Date    WBC 8.3 03/23/2022    RBC 4.94 03/23/2022    HGB 14.5 03/23/2022    HCT 45.7 03/23/2022    MCV 92.5 03/23/2022    RDW 13.1 03/23/2022     03/23/2022       CMP:   Lab Results   Component Value Date     03/23/2022    K 4.1 03/23/2022     03/23/2022    CO2 26 03/23/2022    BUN 15 03/23/2022    CREATININE 0.88 03/23/2022    GFRAA >60 03/23/2022    LABGLOM >60 03/23/2022    GLUCOSE 129 09/12/2021    PROT 6.9 03/05/2019    CALCIUM 9.3 09/12/2021    BILITOT 0.38 03/05/2019    ALKPHOS 52 03/05/2019    AST 24 03/05/2019    ALT 11 03/05/2019       POC Tests: No results for input(s): POCGLU, POCNA, POCK, POCCL, POCBUN, POCHEMO, POCHCT in the last 72 hours.     Coags: No results found for: PROTIME, INR, APTT    HCG (If Applicable): No results found for: PREGTESTUR, PREGSERUM, HCG, HCGQUANT     ABGs: No results found for: PHART, PO2ART, [id number], TXT4FZG, BEART, S1QAGRZZ     Type & Screen (If Applicable):  No results found for: LABABO, LABRH    Drug/Infectious Status (If Applicable):  No results found for: HIV, HEPCAB    COVID-19 Screening (If Applicable): No results found for: COVID19        Anesthesia Evaluation  Patient summary reviewed and Nursing notes reviewed no history of anesthetic complications: Airway: Mallampati: III  TM distance: >3 FB   Neck ROM: full  Mouth opening: > = 3 FB Dental:    (+) poor dentition      Pulmonary:normal exam    (+) COPD:  sleep apnea:  asthma:                            Cardiovascular:  Exercise tolerance: no interval change,   (+) hypertension:, CAD:,       ECG reviewed    Rate: normal                    Neuro/Psych:   (+) CVA: residual symptoms, psychiatric history:            GI/Hepatic/Renal:             Endo/Other:    (+) : arthritis:., .                 Abdominal:   (+) obese,           Vascular: Other Findings:             Anesthesia Plan      general     ASA 3       Induction: intravenous. MIPS: Prophylactic antiemetics administered. Anesthetic plan and risks discussed with patient. Plan discussed with CRNA.     Attending anesthesiologist reviewed and agrees with Preprocedure content              Latonia Griffin DO   5/9/2022

## 2022-05-13 LAB — SURGICAL PATHOLOGY REPORT: NORMAL

## 2023-04-27 ENCOUNTER — HOSPITAL ENCOUNTER (OUTPATIENT)
Dept: ULTRASOUND IMAGING | Age: 68
Discharge: HOME | End: 2023-04-29
Payer: COMMERCIAL

## 2023-04-27 DIAGNOSIS — R10.11 RIGHT UPPER QUADRANT PAIN: ICD-10-CM

## 2023-04-27 PROCEDURE — 76705 ECHO EXAM OF ABDOMEN: CPT

## 2023-04-28 ENCOUNTER — HOSPITAL ENCOUNTER (OUTPATIENT)
Age: 68
Setting detail: SPECIMEN
Discharge: HOME | End: 2023-04-28

## 2023-04-28 LAB
ABSOLUTE EOS #: 0.16 K/UL (ref 0–0.44)
ABSOLUTE IMMATURE GRANULOCYTE: 0.03 K/UL (ref 0–0.3)
ABSOLUTE LYMPH #: 1.57 K/UL (ref 1.1–3.7)
ABSOLUTE MONO #: 0.56 K/UL (ref 0.1–1.2)
ALBUMIN SERPL-MCNC: 3.6 G/DL (ref 3.5–5.2)
ALP SERPL-CCNC: 57 U/L (ref 35–104)
ALT SERPL-CCNC: <5 U/L (ref 5–33)
ANION GAP SERPL CALCULATED.3IONS-SCNC: 9 MMOL/L (ref 9–17)
AST SERPL-CCNC: 8 U/L
BASOPHILS # BLD: 1 % (ref 0–2)
BASOPHILS ABSOLUTE: 0.05 K/UL (ref 0–0.2)
BILIRUB SERPL-MCNC: 0.4 MG/DL (ref 0.3–1.2)
BILIRUBIN URINE: NEGATIVE
BUN SERPL-MCNC: 18 MG/DL (ref 8–23)
BUN/CREAT BLD: 22 (ref 9–20)
CALCIUM SERPL-MCNC: 8.8 MG/DL (ref 8.6–10.4)
CASTS UA: NORMAL /LPF (ref 0–8)
CHLORIDE SERPL-SCNC: 107 MMOL/L (ref 98–107)
CO2 SERPL-SCNC: 28 MMOL/L (ref 20–31)
COLOR: YELLOW
CREAT SERPL-MCNC: 0.81 MG/DL (ref 0.5–0.9)
EOSINOPHILS RELATIVE PERCENT: 2 % (ref 1–4)
EPITHELIAL CELLS UA: NORMAL /HPF (ref 0–5)
GFR SERPL CREATININE-BSD FRML MDRD: >60 ML/MIN/1.73M2
GLUCOSE SERPL-MCNC: 150 MG/DL (ref 70–99)
GLUCOSE UR STRIP.AUTO-MCNC: NEGATIVE MG/DL
HCT VFR BLD AUTO: 44.2 % (ref 36.3–47.1)
HGB BLD-MCNC: 13.8 G/DL (ref 11.9–15.1)
IMMATURE GRANULOCYTES: 0 %
KETONES UR STRIP.AUTO-MCNC: NEGATIVE MG/DL
LEUKOCYTE ESTERASE UR QL STRIP.AUTO: ABNORMAL
LYMPHOCYTES # BLD: 22 % (ref 24–43)
MCH RBC QN AUTO: 28.8 PG (ref 25.2–33.5)
MCHC RBC AUTO-ENTMCNC: 31.2 G/DL (ref 28.4–34.8)
MCV RBC AUTO: 92.3 FL (ref 82.6–102.9)
MONOCYTES # BLD: 8 % (ref 3–12)
NITRITE UR QL STRIP.AUTO: NEGATIVE
NRBC AUTOMATED: 0 PER 100 WBC
PDW BLD-RTO: 11.9 % (ref 11.8–14.4)
PLATELET # BLD AUTO: 208 K/UL (ref 138–453)
PMV BLD AUTO: 9.9 FL (ref 8.1–13.5)
POTASSIUM SERPL-SCNC: 4.1 MMOL/L (ref 3.7–5.3)
PROT SERPL-MCNC: 5.8 G/DL (ref 6.4–8.3)
PROT UR STRIP.AUTO-MCNC: 5.5 MG/DL (ref 5–8)
PROT UR STRIP.AUTO-MCNC: NEGATIVE MG/DL
RBC # BLD: 4.79 M/UL (ref 3.95–5.11)
RBC CLUMPS #/AREA URNS AUTO: NORMAL /HPF (ref 0–4)
SEG NEUTROPHILS: 67 % (ref 36–65)
SEGMENTED NEUTROPHILS ABSOLUTE COUNT: 4.79 K/UL (ref 1.5–8.1)
SODIUM SERPL-SCNC: 144 MMOL/L (ref 135–144)
SPECIFIC GRAVITY UA: 1.02 (ref 1–1.03)
TURBIDITY: CLEAR
URINE HGB: NEGATIVE
UROBILINOGEN, URINE: NORMAL
WBC # BLD AUTO: 7.2 K/UL (ref 3.5–11.3)
WBC UA: NORMAL /HPF (ref 0–5)

## 2023-04-28 PROCEDURE — 36415 COLL VENOUS BLD VENIPUNCTURE: CPT

## 2023-04-28 PROCEDURE — 81001 URINALYSIS AUTO W/SCOPE: CPT

## 2023-04-28 PROCEDURE — 80053 COMPREHEN METABOLIC PANEL: CPT

## 2023-04-28 PROCEDURE — 87086 URINE CULTURE/COLONY COUNT: CPT

## 2023-04-28 PROCEDURE — 85025 COMPLETE CBC W/AUTO DIFF WBC: CPT

## 2023-04-28 PROCEDURE — P9603 ONE-WAY ALLOW PRORATED MILES: HCPCS

## 2023-04-29 LAB
MICROORGANISM SPEC CULT: NORMAL
SPECIMEN DESCRIPTION: NORMAL

## 2023-06-27 ENCOUNTER — HOSPITAL ENCOUNTER (OUTPATIENT)
Dept: CT IMAGING | Age: 68
Discharge: HOME | End: 2023-06-29
Payer: COMMERCIAL

## 2023-06-27 DIAGNOSIS — R10.12 LEFT UPPER QUADRANT PAIN: ICD-10-CM

## 2023-06-27 LAB
CREAT SERPL-MCNC: 0.86 MG/DL (ref 0.5–0.9)
GFR SERPL CREATININE-BSD FRML MDRD: >60 ML/MIN/1.73M2

## 2023-06-27 PROCEDURE — 82565 ASSAY OF CREATININE: CPT

## 2023-06-27 PROCEDURE — 74177 CT ABD & PELVIS W/CONTRAST: CPT

## 2023-06-27 PROCEDURE — 2500000003 HC RX 250 WO HCPCS: Performed by: FAMILY MEDICINE

## 2023-06-27 PROCEDURE — 6360000004 HC RX CONTRAST MEDICATION: Performed by: FAMILY MEDICINE

## 2023-06-27 PROCEDURE — 36415 COLL VENOUS BLD VENIPUNCTURE: CPT

## 2023-06-27 PROCEDURE — 2580000003 HC RX 258: Performed by: FAMILY MEDICINE

## 2023-06-27 RX ORDER — SODIUM CHLORIDE 0.9 % (FLUSH) 0.9 %
10 SYRINGE (ML) INJECTION PRN
Status: DISCONTINUED | OUTPATIENT
Start: 2023-06-27 | End: 2023-06-30 | Stop reason: HOSPADM

## 2023-06-27 RX ORDER — 0.9 % SODIUM CHLORIDE 0.9 %
80 INTRAVENOUS SOLUTION INTRAVENOUS ONCE
Status: COMPLETED | OUTPATIENT
Start: 2023-06-27 | End: 2023-06-27

## 2023-06-27 RX ADMIN — SODIUM CHLORIDE, PRESERVATIVE FREE 10 ML: 5 INJECTION INTRAVENOUS at 15:02

## 2023-06-27 RX ADMIN — BARIUM SULFATE 450 ML: 20 SUSPENSION ORAL at 15:07

## 2023-06-27 RX ADMIN — IOPAMIDOL 75 ML: 755 INJECTION, SOLUTION INTRAVENOUS at 15:02

## 2023-06-27 RX ADMIN — SODIUM CHLORIDE 80 ML: 9 INJECTION, SOLUTION INTRAVENOUS at 15:02

## 2024-04-08 ENCOUNTER — HOSPITAL ENCOUNTER (OUTPATIENT)
Age: 69
Setting detail: SPECIMEN
Discharge: HOME | End: 2024-04-08
Payer: COMMERCIAL

## 2024-04-08 LAB
25(OH)D3 SERPL-MCNC: 55.8 NG/ML (ref 30–100)
ALBUMIN SERPL-MCNC: 3.6 G/DL (ref 3.5–5.2)
ALP SERPL-CCNC: 62 U/L (ref 35–104)
ALT SERPL-CCNC: 7 U/L (ref 5–33)
ANION GAP SERPL CALCULATED.3IONS-SCNC: 11 MMOL/L (ref 9–17)
AST SERPL-CCNC: 10 U/L
BASOPHILS # BLD: 0.03 K/UL (ref 0–0.2)
BASOPHILS NFR BLD: 1 % (ref 0–2)
BILIRUB SERPL-MCNC: 0.4 MG/DL (ref 0.3–1.2)
BUN SERPL-MCNC: 19 MG/DL (ref 8–23)
BUN/CREAT SERPL: 21 (ref 9–20)
CALCIUM SERPL-MCNC: 9.1 MG/DL (ref 8.6–10.4)
CHLORIDE SERPL-SCNC: 105 MMOL/L (ref 98–107)
CHOLEST SERPL-MCNC: 124 MG/DL (ref 0–199)
CHOLESTEROL/HDL RATIO: 3
CO2 SERPL-SCNC: 27 MMOL/L (ref 20–31)
CREAT SERPL-MCNC: 0.9 MG/DL (ref 0.5–0.9)
CRP SERPL HS-MCNC: <3 MG/L (ref 0–5)
EOSINOPHIL # BLD: 0.2 K/UL (ref 0–0.44)
EOSINOPHILS RELATIVE PERCENT: 3 % (ref 1–4)
ERYTHROCYTE [DISTWIDTH] IN BLOOD BY AUTOMATED COUNT: 12.8 % (ref 11.8–14.4)
ERYTHROCYTE [SEDIMENTATION RATE] IN BLOOD BY PHOTOMETRIC METHOD: 4 MM/HR (ref 0–30)
EST. AVERAGE GLUCOSE BLD GHB EST-MCNC: 114 MG/DL
GFR SERPL CREATININE-BSD FRML MDRD: 69 ML/MIN/1.73M2
GLUCOSE SERPL-MCNC: 87 MG/DL (ref 70–99)
HBA1C MFR BLD: 5.6 % (ref 4–6)
HCT VFR BLD AUTO: 44.4 % (ref 36.3–47.1)
HDLC SERPL-MCNC: 37 MG/DL
HGB BLD-MCNC: 13.8 G/DL (ref 11.9–15.1)
IMM GRANULOCYTES # BLD AUTO: 0.02 K/UL (ref 0–0.3)
IMM GRANULOCYTES NFR BLD: 0 %
INSULIN REFERENCE RANGE:: NORMAL
INSULIN: 17.2 MU/L
LDLC SERPL CALC-MCNC: 69 MG/DL (ref 0–100)
LYMPHOCYTES NFR BLD: 1.65 K/UL (ref 1.1–3.7)
LYMPHOCYTES RELATIVE PERCENT: 27 % (ref 24–43)
MAGNESIUM SERPL-MCNC: 2 MG/DL (ref 1.6–2.6)
MCH RBC QN AUTO: 28.9 PG (ref 25.2–33.5)
MCHC RBC AUTO-ENTMCNC: 31.1 G/DL (ref 28.4–34.8)
MCV RBC AUTO: 92.9 FL (ref 82.6–102.9)
MONOCYTES NFR BLD: 0.59 K/UL (ref 0.1–1.2)
MONOCYTES NFR BLD: 10 % (ref 3–12)
NEUTROPHILS NFR BLD: 59 % (ref 36–65)
NEUTS SEG NFR BLD: 3.64 K/UL (ref 1.5–8.1)
NRBC BLD-RTO: 0 PER 100 WBC
PLATELET # BLD AUTO: 215 K/UL (ref 138–453)
PMV BLD AUTO: 10.1 FL (ref 8.1–13.5)
POTASSIUM SERPL-SCNC: 3.8 MMOL/L (ref 3.7–5.3)
PROT SERPL-MCNC: 5.9 G/DL (ref 6.4–8.3)
RBC # BLD AUTO: 4.78 M/UL (ref 3.95–5.11)
SODIUM SERPL-SCNC: 143 MMOL/L (ref 135–144)
TRIGL SERPL-MCNC: 91 MG/DL
TSH SERPL DL<=0.05 MIU/L-ACNC: 2.02 UIU/ML (ref 0.3–5)
VIT B12 SERPL-MCNC: 302 PG/ML (ref 232–1245)
VLDLC SERPL CALC-MCNC: 18 MG/DL
WBC OTHER # BLD: 6.1 K/UL (ref 3.5–11.3)

## 2024-04-08 PROCEDURE — 80053 COMPREHEN METABOLIC PANEL: CPT

## 2024-04-08 PROCEDURE — 86140 C-REACTIVE PROTEIN: CPT

## 2024-04-08 PROCEDURE — P9603 ONE-WAY ALLOW PRORATED MILES: HCPCS

## 2024-04-08 PROCEDURE — 85652 RBC SED RATE AUTOMATED: CPT

## 2024-04-08 PROCEDURE — 36415 COLL VENOUS BLD VENIPUNCTURE: CPT

## 2024-04-08 PROCEDURE — 84443 ASSAY THYROID STIM HORMONE: CPT

## 2024-04-08 PROCEDURE — 82306 VITAMIN D 25 HYDROXY: CPT

## 2024-04-08 PROCEDURE — 85025 COMPLETE CBC W/AUTO DIFF WBC: CPT

## 2024-04-08 PROCEDURE — 83525 ASSAY OF INSULIN: CPT

## 2024-04-08 PROCEDURE — 82607 VITAMIN B-12: CPT

## 2024-04-08 PROCEDURE — 83735 ASSAY OF MAGNESIUM: CPT

## 2024-04-08 PROCEDURE — 83036 HEMOGLOBIN GLYCOSYLATED A1C: CPT

## 2024-04-08 PROCEDURE — 80061 LIPID PANEL: CPT

## 2024-04-18 ENCOUNTER — HOSPITAL ENCOUNTER (OUTPATIENT)
Age: 69
Setting detail: SPECIMEN
Discharge: HOME | End: 2024-04-18
Payer: COMMERCIAL

## 2024-04-18 LAB — 25(OH)D3 SERPL-MCNC: 48.9 NG/ML (ref 30–100)

## 2024-04-18 PROCEDURE — 82306 VITAMIN D 25 HYDROXY: CPT

## 2024-04-18 PROCEDURE — P9603 ONE-WAY ALLOW PRORATED MILES: HCPCS

## 2024-04-18 PROCEDURE — 36415 COLL VENOUS BLD VENIPUNCTURE: CPT

## 2024-10-17 ENCOUNTER — HOSPITAL ENCOUNTER (OUTPATIENT)
Age: 69
Setting detail: SPECIMEN
Discharge: HOME | End: 2024-10-17

## 2024-10-17 LAB — 25(OH)D3 SERPL-MCNC: 52.2 NG/ML (ref 30–100)

## 2024-10-17 PROCEDURE — P9603 ONE-WAY ALLOW PRORATED MILES: HCPCS

## 2024-10-17 PROCEDURE — 82306 VITAMIN D 25 HYDROXY: CPT

## 2024-10-17 PROCEDURE — 36415 COLL VENOUS BLD VENIPUNCTURE: CPT

## 2024-10-18 ENCOUNTER — HOSPITAL ENCOUNTER (OUTPATIENT)
Age: 69
Setting detail: SPECIMEN
Discharge: HOME | End: 2024-10-18

## 2025-02-10 ENCOUNTER — HOSPITAL ENCOUNTER (OUTPATIENT)
Age: 70
Setting detail: SPECIMEN
Discharge: HOME | End: 2025-02-10
Payer: COMMERCIAL

## 2025-02-10 PROCEDURE — 81001 URINALYSIS AUTO W/SCOPE: CPT

## 2025-02-11 LAB
BILIRUB UR QL STRIP: NEGATIVE
CLARITY UR: ABNORMAL
COLOR UR: YELLOW
CRYSTALS URNS MICRO: ABNORMAL /HPF
EPI CELLS #/AREA URNS HPF: ABNORMAL /HPF (ref 0–5)
GLUCOSE UR STRIP-MCNC: NEGATIVE MG/DL
HGB UR QL STRIP.AUTO: NEGATIVE
KETONES UR STRIP-MCNC: NEGATIVE MG/DL
LEUKOCYTE ESTERASE UR QL STRIP: ABNORMAL
NITRITE UR QL STRIP: NEGATIVE
PH UR STRIP: 6 [PH] (ref 5–8)
PROT UR STRIP-MCNC: NEGATIVE MG/DL
RBC #/AREA URNS HPF: ABNORMAL /HPF (ref 0–2)
SP GR UR STRIP: 1.02 (ref 1–1.03)
UROBILINOGEN UR STRIP-ACNC: NORMAL EU/DL (ref 0–1)
WBC #/AREA URNS HPF: ABNORMAL /HPF (ref 0–5)

## 2025-02-12 ENCOUNTER — HOSPITAL ENCOUNTER (OUTPATIENT)
Age: 70
Setting detail: SPECIMEN
Discharge: HOME | End: 2025-02-12

## 2025-02-12 PROCEDURE — 83036 HEMOGLOBIN GLYCOSYLATED A1C: CPT

## 2025-02-12 PROCEDURE — 36415 COLL VENOUS BLD VENIPUNCTURE: CPT

## 2025-02-13 LAB
EST. AVERAGE GLUCOSE BLD GHB EST-MCNC: 108 MG/DL
HBA1C MFR BLD: 5.4 % (ref 4–6)

## 2025-04-04 ENCOUNTER — HOSPITAL ENCOUNTER (OUTPATIENT)
Age: 70
Setting detail: SPECIMEN
Discharge: HOME | End: 2025-04-04

## 2025-04-04 LAB
25(OH)D3 SERPL-MCNC: 44.6 NG/ML (ref 30–100)
ALBUMIN SERPL-MCNC: 3.2 G/DL (ref 3.5–5.2)
ALBUMIN/GLOB SERPL: 1.7 {RATIO} (ref 1–2.5)
ALP SERPL-CCNC: 61 U/L (ref 35–104)
ALT SERPL-CCNC: 11 U/L (ref 10–35)
ANION GAP SERPL CALCULATED.3IONS-SCNC: 12 MMOL/L (ref 9–16)
AST SERPL-CCNC: 16 U/L (ref 10–35)
BILIRUB SERPL-MCNC: 0.3 MG/DL (ref 0–1.2)
BUN SERPL-MCNC: 29 MG/DL (ref 8–23)
CALCIUM SERPL-MCNC: 8.6 MG/DL (ref 8.8–10.2)
CHLORIDE SERPL-SCNC: 111 MMOL/L (ref 98–107)
CHOLEST SERPL-MCNC: 118 MG/DL (ref 0–199)
CHOLESTEROL/HDL RATIO: 3.7
CO2 SERPL-SCNC: 22 MMOL/L (ref 20–31)
CREAT SERPL-MCNC: 0.8 MG/DL (ref 0.5–0.9)
ERYTHROCYTE [DISTWIDTH] IN BLOOD BY AUTOMATED COUNT: 13.1 % (ref 11.8–14.4)
EST. AVERAGE GLUCOSE BLD GHB EST-MCNC: 111 MG/DL
GFR, ESTIMATED: 77 ML/MIN/1.73M2
GLUCOSE SERPL-MCNC: 94 MG/DL (ref 82–115)
HBA1C MFR BLD: 5.5 % (ref 4–6)
HCT VFR BLD AUTO: 39.8 % (ref 36.3–47.1)
HDLC SERPL-MCNC: 32 MG/DL
HGB BLD-MCNC: 12.6 G/DL (ref 11.9–15.1)
LDLC SERPL CALC-MCNC: 66 MG/DL (ref 0–100)
MAGNESIUM SERPL-MCNC: 2 MG/DL (ref 1.6–2.4)
MCH RBC QN AUTO: 30.2 PG (ref 25.2–33.5)
MCHC RBC AUTO-ENTMCNC: 31.7 G/DL (ref 28.4–34.8)
MCV RBC AUTO: 95.4 FL (ref 82.6–102.9)
NRBC BLD-RTO: 0 PER 100 WBC
PLATELET # BLD AUTO: 187 K/UL (ref 138–453)
PMV BLD AUTO: 9.7 FL (ref 8.1–13.5)
POTASSIUM SERPL-SCNC: 4.2 MMOL/L (ref 3.7–5.3)
PROT SERPL-MCNC: 5.1 G/DL (ref 6.6–8.7)
RBC # BLD AUTO: 4.17 M/UL (ref 3.95–5.11)
SODIUM SERPL-SCNC: 145 MMOL/L (ref 136–145)
TRIGL SERPL-MCNC: 102 MG/DL (ref 0–149)
TSH SERPL DL<=0.05 MIU/L-ACNC: 1.01 UIU/ML (ref 0.27–4.2)
VLDLC SERPL CALC-MCNC: 20 MG/DL (ref 1–30)
WBC OTHER # BLD: 5.2 K/UL (ref 3.5–11.3)

## 2025-04-04 PROCEDURE — 80053 COMPREHEN METABOLIC PANEL: CPT

## 2025-04-04 PROCEDURE — 82306 VITAMIN D 25 HYDROXY: CPT

## 2025-04-04 PROCEDURE — 83735 ASSAY OF MAGNESIUM: CPT

## 2025-04-04 PROCEDURE — 36415 COLL VENOUS BLD VENIPUNCTURE: CPT

## 2025-04-04 PROCEDURE — 80061 LIPID PANEL: CPT

## 2025-04-04 PROCEDURE — 84443 ASSAY THYROID STIM HORMONE: CPT

## 2025-04-04 PROCEDURE — 85027 COMPLETE CBC AUTOMATED: CPT

## 2025-04-04 PROCEDURE — 83036 HEMOGLOBIN GLYCOSYLATED A1C: CPT

## 2025-07-27 ENCOUNTER — HOSPITAL ENCOUNTER (OUTPATIENT)
Age: 70
Setting detail: SPECIMEN
Discharge: HOME | End: 2025-07-27

## (undated) DEVICE — SUTURE PERMAHAND SZ 3-0 L30IN NONABSORBABLE BLK SH L26MM K832H

## (undated) DEVICE — SURGICAL PROCEDURE KIT BASIN MAJ SET UP

## (undated) DEVICE — SUTURE MCRYL SZ 4-0 L27IN ABSRB UD L19MM PS-2 1/2 CIR PRIM Y426H

## (undated) DEVICE — SUTURE VCRL SZ 3-0 L27IN ABSRB UD L26MM SH 1/2 CIR J416H

## (undated) DEVICE — GAUZE,SPONGE,FLUFF,6"X6.75",STRL,5/TRAY: Brand: MEDLINE

## (undated) DEVICE — GARMENT,MEDLINE,DVT,INT,CALF,MED, GEN2: Brand: MEDLINE

## (undated) DEVICE — SKIN PREP TRAY W/CHG: Brand: MEDLINE INDUSTRIES, INC.

## (undated) DEVICE — APPLIER LIG CLP M L11IN TI STR RNG HNDL FOR 20 CLP DISP

## (undated) DEVICE — ADHESIVE SKIN CLOSURE TOP 36 CC HI VISC DERMBND MINI

## (undated) DEVICE — GLOVE SURG SZ 75 CRM LTX FREE POLYISOPRENE POLYMER BEAD ANTI

## (undated) DEVICE — HYPODERMIC SAFETY NEEDLE: Brand: MAGELLAN

## (undated) DEVICE — SYRINGE, LUER LOCK, 10ML: Brand: MEDLINE

## (undated) DEVICE — BLANKET WRM W40.2XL55.9IN IORT LO BODY + MISTRAL AIR

## (undated) DEVICE — INTENDED FOR TISSUE SEPARATION, AND OTHER PROCEDURES THAT REQUIRE A SHARP SURGICAL BLADE TO PUNCTURE OR CUT.: Brand: BARD-PARKER ® CARBON RIB-BACK BLADES